# Patient Record
Sex: MALE | Race: WHITE | NOT HISPANIC OR LATINO | Employment: FULL TIME | URBAN - METROPOLITAN AREA
[De-identification: names, ages, dates, MRNs, and addresses within clinical notes are randomized per-mention and may not be internally consistent; named-entity substitution may affect disease eponyms.]

---

## 2018-10-10 ENCOUNTER — LAB VISIT (OUTPATIENT)
Dept: LAB | Facility: HOSPITAL | Age: 33
End: 2018-10-10
Attending: INTERNAL MEDICINE
Payer: COMMERCIAL

## 2018-10-10 ENCOUNTER — OFFICE VISIT (OUTPATIENT)
Dept: FAMILY MEDICINE | Facility: CLINIC | Age: 33
End: 2018-10-10
Payer: COMMERCIAL

## 2018-10-10 VITALS
DIASTOLIC BLOOD PRESSURE: 80 MMHG | HEIGHT: 72 IN | TEMPERATURE: 98 F | SYSTOLIC BLOOD PRESSURE: 102 MMHG | HEART RATE: 72 BPM | WEIGHT: 204.94 LBS | BODY MASS INDEX: 27.76 KG/M2

## 2018-10-10 DIAGNOSIS — Z00.00 ANNUAL PHYSICAL EXAM: ICD-10-CM

## 2018-10-10 DIAGNOSIS — Z92.29 HISTORY OF OPIATE THERAPY: ICD-10-CM

## 2018-10-10 DIAGNOSIS — R35.1 NOCTURIA MORE THAN TWICE PER NIGHT: ICD-10-CM

## 2018-10-10 DIAGNOSIS — L81.8 HISTORY OF BEING TATOOED: ICD-10-CM

## 2018-10-10 DIAGNOSIS — Z23 NEED FOR DIPHTHERIA-TETANUS-PERTUSSIS (TDAP) VACCINE: ICD-10-CM

## 2018-10-10 DIAGNOSIS — Z23 NEED FOR IMMUNIZATION AGAINST INFLUENZA: ICD-10-CM

## 2018-10-10 DIAGNOSIS — Z00.00 ANNUAL PHYSICAL EXAM: Primary | ICD-10-CM

## 2018-10-10 DIAGNOSIS — R35.0 FREQUENT URINATION: ICD-10-CM

## 2018-10-10 DIAGNOSIS — Z78.9 EXCESSIVE CAFFEINE INTAKE: ICD-10-CM

## 2018-10-10 DIAGNOSIS — E66.3 OVERWEIGHT (BMI 25.0-29.9): ICD-10-CM

## 2018-10-10 LAB
BILIRUB UR QL STRIP: NEGATIVE
CLARITY UR REFRACT.AUTO: CLEAR
COLOR UR AUTO: YELLOW
GLUCOSE SERPL-MCNC: 108 MG/DL (ref 70–110)
GLUCOSE UR QL STRIP: NEGATIVE
HGB UR QL STRIP: ABNORMAL
KETONES UR QL STRIP: NEGATIVE
LEUKOCYTE ESTERASE UR QL STRIP: NEGATIVE
MICROSCOPIC COMMENT: NORMAL
NITRITE UR QL STRIP: NEGATIVE
PH UR STRIP: 6 [PH] (ref 5–8)
PROT UR QL STRIP: NEGATIVE
RBC #/AREA URNS AUTO: 4 /HPF (ref 0–4)
SP GR UR STRIP: 1.01 (ref 1–1.03)
SQUAMOUS #/AREA URNS AUTO: 0 /HPF
URN SPEC COLLECT METH UR: ABNORMAL
UROBILINOGEN UR STRIP-ACNC: NEGATIVE EU/DL

## 2018-10-10 PROCEDURE — 87086 URINE CULTURE/COLONY COUNT: CPT

## 2018-10-10 PROCEDURE — 90715 TDAP VACCINE 7 YRS/> IM: CPT | Mod: S$GLB,,, | Performed by: INTERNAL MEDICINE

## 2018-10-10 PROCEDURE — 81001 URINALYSIS AUTO W/SCOPE: CPT

## 2018-10-10 PROCEDURE — 90472 IMMUNIZATION ADMIN EACH ADD: CPT | Mod: S$GLB,,, | Performed by: INTERNAL MEDICINE

## 2018-10-10 PROCEDURE — 99999 PR PBB SHADOW E&M-NEW PATIENT-LVL III: CPT | Mod: PBBFAC,,, | Performed by: INTERNAL MEDICINE

## 2018-10-10 PROCEDURE — 90686 IIV4 VACC NO PRSV 0.5 ML IM: CPT | Mod: S$GLB,,, | Performed by: INTERNAL MEDICINE

## 2018-10-10 PROCEDURE — 99385 PREV VISIT NEW AGE 18-39: CPT | Mod: 25,S$GLB,, | Performed by: INTERNAL MEDICINE

## 2018-10-10 PROCEDURE — 90471 IMMUNIZATION ADMIN: CPT | Mod: S$GLB,,, | Performed by: INTERNAL MEDICINE

## 2018-10-10 PROCEDURE — 82948 REAGENT STRIP/BLOOD GLUCOSE: CPT | Mod: S$GLB,,, | Performed by: INTERNAL MEDICINE

## 2018-10-10 RX ORDER — BUPRENORPHINE HYDROCHLORIDE, NALOXONE HYDROCHLORIDE 8; 2 MG/1; MG/1
1 FILM, SOLUBLE BUCCAL; SUBLINGUAL 2 TIMES DAILY
Refills: 2 | COMMUNITY
Start: 2018-09-25 | End: 2018-12-18

## 2018-10-10 NOTE — PROGRESS NOTES
Subjective:       Patient ID: Raúl Francis is a 33 y.o. male.    Chief Complaint: Establish Care and Urinary Frequency (started a year ago)    HPI  Here to get est w me as his PCP.  PMH and surgical hx delineated and noted. SMH/FMH also delineated and noted. ROS obtained at length prior to physical being performed.  Vaccines discussed; influ vaccine today w Tdap vaccine.  TC never needed; no FMH of cancer. No FMH of DM, HTN, or HLP, or CAD.  Routine initial colonoscopy to be at age 43  Hx of overweight though; BMI 27.79 discussed need for exercise w weight reduction.  Frequent urination for 1 yr; caffeine has heavy intake: 2 energy drinks a day; coffee on weekend; eats a lot of chocolate as well daily. Drinks Coke Zero all during the day. No dysuria or foul odor to his urine. No fever or chills. Nocturia avr 3x a night; no FMH of prostate cancer. No flank pain or abd pain. FMH neg for DM. EFREN declined.  Total time: 905-946; >50% time spent on discussion, counseling, and review.    Review of Systems   Constitutional: Negative for appetite change and unexpected weight change.   HENT: Negative for congestion, postnasal drip, rhinorrhea and sinus pressure.         Denies seasonal allergies, or perennial allergies   Eyes: Negative for discharge and itching.   Respiratory: Negative for cough, chest tightness, shortness of breath and wheezing.    Cardiovascular: Negative for chest pain, palpitations and leg swelling.   Gastrointestinal: Negative for abdominal pain, blood in stool, constipation, diarrhea, nausea and vomiting.        Reflux at times; zantac 150 as needed. Advised against bedtime snacks.   Endocrine: Negative for polydipsia, polyphagia and polyuria.   Genitourinary: Negative for dysuria and hematuria.   Musculoskeletal: Negative for arthralgias and myalgias.   Skin: Negative for rash.   Allergic/Immunologic: Negative for environmental allergies and food allergies.   Neurological: Negative for tremors,  seizures and headaches.   Hematological: Negative for adenopathy. Does not bruise/bleed easily.   Psychiatric/Behavioral:        Denies anxiety or depression.       Objective:      Vitals:    10/10/18 0855   BP: 102/80   Pulse: 72   Temp: 98.3 °F (36.8 °C)   Weight: 92.9 kg (204 lb 14.7 oz)   Height: 6' (1.829 m)     Body mass index is 27.79 kg/m².    Physical Exam   Constitutional: He is oriented to person, place, and time. He appears well-developed and well-nourished.   HENT:   Head: Normocephalic and atraumatic.   Throat pink and moist.   Eyes: EOM are normal.   Neck: Normal range of motion. Neck supple. No thyromegaly present.   No carotid bruits heard.   Cardiovascular: Normal rate, regular rhythm and normal heart sounds. Exam reveals no gallop.   No murmur heard.  Pulmonary/Chest: Effort normal and breath sounds normal. No respiratory distress. He has no wheezes. He has no rales.   Abdominal: Soft. Bowel sounds are normal. He exhibits no distension. There is no tenderness. There is no rebound and no guarding.   Musculoskeletal: Normal range of motion. He exhibits no edema.   No tenderness to palp T-L-S sp noted.   Lymphadenopathy:     He has no cervical adenopathy.   Neurological: He is alert and oriented to person, place, and time.   Moves all 4 extremities fine.   Skin: No rash noted.   tattooes UE's.   Psychiatric: He has a normal mood and affect. His behavior is normal. Thought content normal.   Vitals reviewed.      Assessment:       1. Annual physical exam    2. Need for immunization against influenza    3. Need for diphtheria-tetanus-pertussis (Tdap) vaccine    4. Overweight (BMI 25.0-29.9)    5. Frequent urination    6. Nocturia more than twice per night    7. Excessive caffeine intake    8. History of opiate therapy    9. History of being tatooed        Plan:       Annual physical exam; regular exercise; weight reduction; smaller portions; yearly physical; healthy lifestyle choices; low fat high fiber  diet; needs to markedly decrease caffeine intake. Get annual physical, and labs as per provider recommendations.  Keep his follow-up appointments with his physicians and providers as directed including workups; ; routine total colonoscopy to begin at age 43..  -     Lipid panel; Future; Expected date: 10/10/2018  -     CBC auto differential; Future; Expected date: 10/10/2018  -     Comprehensive metabolic panel; Future; Expected date: 10/10/2018  -     URINALYSIS; Future; Expected date: 10/10/2018  -     Urine culture; Future; Expected date: 10/10/2018  -     (In Office Administered) Tdap Vaccine  -     Influenza - Quadrivalent (3 years & older)  -     HEMOGLOBIN A1C; Future; Expected date: 10/10/2018    Need for immunization against influenza  -     Influenza - Quadrivalent (3 years & older)    Need for diphtheria-tetanus-pertussis (Tdap) vaccine  -     (In Office Administered) Tdap Vaccine    Overweight (BMI 25.0-29.9). Caloric restriction w regular exercise and weight reduction.  -     Lipid panel; Future; Expected date: 10/10/2018  -     HEMOGLOBIN A1C; Future; Expected date: 10/10/2018    Frequent urination; no signs of UTI clinically; suspect excess caffeine as culprit; but need to r/o other causes as well.  -     Comprehensive metabolic panel; Future; Expected date: 10/10/2018  -     URINALYSIS; Future; Expected date: 10/10/2018  -     Urine culture; Future; Expected date: 10/10/2018  -     HEMOGLOBIN A1C; Future; Expected date: 10/10/2018  -     POCT Glucose    Nocturia more then 2x a night; with excessive caffeine intake have 1st next him to reduce his caffeine intake at least 50% if not more; there is no family history of prostate cancer but will send in a PSA for screening; patient wishes to wait on digital rectal exam; less likely as the possibility of prostatitis which is also in the differential as well as UTI.  Will await urinalysis results and culture    Excessive caffeine intake; wean down as  tolerated; needs to significantly reduce his caffeine intake as discussed.  -     Comprehensive metabolic panel; Future; Expected date: 10/10/2018    History of opiate therapy; on suboxone per [facility].  -     Comprehensive metabolic panel; Future; Expected date: 10/10/2018    History of being tatooed  -     Hepatitis C antibody; Future; Expected date: 10/10/2018  -     Hepatitis B core antibody, IgM; Future; Expected date: 10/10/2018  -     Hepatitis B surface antigen; Future; Expected date: 10/10/2018

## 2018-10-10 NOTE — PATIENT INSTRUCTIONS
Annual physical exam; regular exercise; weight reduction; smaller portions; yearly physical; healthy lifestyle choices; low fat high fiber diet; needs to markedly decrease caffeine intake. Get annual physical, and labs as per provider recommendations.  -     Lipid panel; Future; Expected date: 10/10/2018  -     CBC auto differential; Future; Expected date: 10/10/2018  -     Comprehensive metabolic panel; Future; Expected date: 10/10/2018  -     URINALYSIS; Future; Expected date: 10/10/2018  -     Urine culture; Future; Expected date: 10/10/2018  -     (In Office Administered) Tdap Vaccine  -     Influenza - Quadrivalent (3 years & older)  -     HEMOGLOBIN A1C; Future; Expected date: 10/10/2018    Need for immunization against influenza  -     Influenza - Quadrivalent (3 years & older)    Need for diphtheria-tetanus-pertussis (Tdap) vaccine  -     (In Office Administered) Tdap Vaccine    Overweight (BMI 25.0-29.9). Caloric restriction w regular exercise and weight reduction.  -     Lipid panel; Future; Expected date: 10/10/2018  -     HEMOGLOBIN A1C; Future; Expected date: 10/10/2018    Frequent urination; no signs of UTI clinically; suspect excess caffeine as culprit; but need to r/o other causes as well.  -     Comprehensive metabolic panel; Future; Expected date: 10/10/2018  -     URINALYSIS; Future; Expected date: 10/10/2018  -     Urine culture; Future; Expected date: 10/10/2018  -     HEMOGLOBIN A1C; Future; Expected date: 10/10/2018  -     POCT Glucose    Excessive caffeine intake; wean down as tolerated; needs to significantly reduce his caffeine intake as discussed.  -     Comprehensive metabolic panel; Future; Expected date: 10/10/2018    History of opiate therapy; on suboxone per [facility].  -     Comprehensive metabolic panel; Future; Expected date: 10/10/2018    History of being tatooed  -     Hepatitis C antibody; Future; Expected date: 10/10/2018  -     Hepatitis B core antibody, IgM; Future;  Expected date: 10/10/2018  -     Hepatitis B surface antigen; Future; Expected date: 10/10/2018

## 2018-10-11 LAB — BACTERIA UR CULT: NO GROWTH

## 2018-10-13 ENCOUNTER — TELEPHONE (OUTPATIENT)
Dept: FAMILY MEDICINE | Facility: CLINIC | Age: 33
End: 2018-10-13

## 2018-10-13 NOTE — TELEPHONE ENCOUNTER
----- Message from Murali Ambrose sent at 10/13/2018  8:47 AM CDT -----  Contact: wife Mikki   Wife Mikki may have missed a call from your office, please call back at [phone]

## 2018-10-14 ENCOUNTER — TELEPHONE (OUTPATIENT)
Dept: FAMILY MEDICINE | Facility: CLINIC | Age: 33
End: 2018-10-14

## 2018-10-14 DIAGNOSIS — R31.0 MACROSCOPIC HEMATURIA: Primary | ICD-10-CM

## 2018-10-14 NOTE — TELEPHONE ENCOUNTER
Please ask patient to pass by [facility] and drop off a urine specimen as a clean-catch midstream so we can repeat his urinalysis as well as microscopic; as previously his dipstick had 2+ blood on it.

## 2018-10-16 ENCOUNTER — LAB VISIT (OUTPATIENT)
Dept: LAB | Facility: HOSPITAL | Age: 33
End: 2018-10-16
Attending: INTERNAL MEDICINE
Payer: COMMERCIAL

## 2018-10-16 DIAGNOSIS — R31.0 MACROSCOPIC HEMATURIA: ICD-10-CM

## 2018-10-16 LAB
AMORPH CRY UR QL COMP ASSIST: ABNORMAL
BILIRUB UR QL STRIP: NEGATIVE
CLARITY UR REFRACT.AUTO: ABNORMAL
COLOR UR AUTO: ABNORMAL
GLUCOSE UR QL STRIP: NEGATIVE
HGB UR QL STRIP: ABNORMAL
KETONES UR QL STRIP: NEGATIVE
LEUKOCYTE ESTERASE UR QL STRIP: NEGATIVE
MICROSCOPIC COMMENT: ABNORMAL
NITRITE UR QL STRIP: NEGATIVE
PH UR STRIP: 5 [PH] (ref 5–8)
PROT UR QL STRIP: NEGATIVE
RBC #/AREA URNS AUTO: 20 /HPF (ref 0–4)
SP GR UR STRIP: 1.03 (ref 1–1.03)
URN SPEC COLLECT METH UR: ABNORMAL
UROBILINOGEN UR STRIP-ACNC: NEGATIVE EU/DL

## 2018-10-16 PROCEDURE — 81001 URINALYSIS AUTO W/SCOPE: CPT

## 2018-10-20 ENCOUNTER — TELEPHONE (OUTPATIENT)
Dept: FAMILY MEDICINE | Facility: CLINIC | Age: 33
End: 2018-10-20

## 2018-10-20 DIAGNOSIS — N30.91 CYSTITIS WITH HEMATURIA: Primary | ICD-10-CM

## 2018-10-20 RX ORDER — CIPROFLOXACIN 500 MG/1
500 TABLET ORAL EVERY 12 HOURS
Qty: 20 TABLET | Refills: 0 | Status: SHIPPED | OUTPATIENT
Start: 2018-10-20 | End: 2018-12-18 | Stop reason: ALTCHOICE

## 2018-10-20 NOTE — TELEPHONE ENCOUNTER
Please add a PSA screening to his scheduled labs with a diagnosis of nocturia 3 times per night; it is been ordered at [facility]; thank you

## 2018-10-20 NOTE — TELEPHONE ENCOUNTER
Please notify patient that with 2+ blood on his dipstick on repeat urinalysis and urine being cloudy as well as symptoms of frequency and nocturia we would like him to take 10 days of Cipro at 500 mg every 12 hr for 10 days to give him prostatitis and UTI coverage.  This has been sent into his pharmacist. After completion of the antibiotics and a few days later, would like him to go back to get repeat urinalysis specimen; keep his follow-up appointment with us in the office.  Remember to decrease his caffeine intake at least 50%; if he develops any muscle skeletal spasms or tendon issues please have him stop the Cipro and call us

## 2018-10-22 ENCOUNTER — LAB VISIT (OUTPATIENT)
Dept: LAB | Facility: HOSPITAL | Age: 33
End: 2018-10-22
Attending: INTERNAL MEDICINE
Payer: COMMERCIAL

## 2018-10-22 DIAGNOSIS — R35.0 FREQUENT URINATION: ICD-10-CM

## 2018-10-22 DIAGNOSIS — Z78.9 EXCESSIVE CAFFEINE INTAKE: ICD-10-CM

## 2018-10-22 DIAGNOSIS — E66.3 OVERWEIGHT (BMI 25.0-29.9): ICD-10-CM

## 2018-10-22 DIAGNOSIS — Z00.00 ANNUAL PHYSICAL EXAM: ICD-10-CM

## 2018-10-22 DIAGNOSIS — L81.8 HISTORY OF BEING TATOOED: ICD-10-CM

## 2018-10-22 DIAGNOSIS — Z92.29 HISTORY OF OPIATE THERAPY: ICD-10-CM

## 2018-10-22 LAB
ALBUMIN SERPL BCP-MCNC: 4 G/DL
ALP SERPL-CCNC: 88 U/L
ALT SERPL W/O P-5'-P-CCNC: 24 U/L
ANION GAP SERPL CALC-SCNC: 7 MMOL/L
AST SERPL-CCNC: 36 U/L
BASOPHILS # BLD AUTO: 0.08 K/UL
BASOPHILS NFR BLD: 0.8 %
BILIRUB SERPL-MCNC: 0.3 MG/DL
BUN SERPL-MCNC: 16 MG/DL
CALCIUM SERPL-MCNC: 9.6 MG/DL
CHLORIDE SERPL-SCNC: 105 MMOL/L
CHOLEST SERPL-MCNC: 204 MG/DL
CHOLEST/HDLC SERPL: 3.7 {RATIO}
CO2 SERPL-SCNC: 28 MMOL/L
CREAT SERPL-MCNC: 1 MG/DL
DIFFERENTIAL METHOD: ABNORMAL
EOSINOPHIL # BLD AUTO: 0.3 K/UL
EOSINOPHIL NFR BLD: 3.1 %
ERYTHROCYTE [DISTWIDTH] IN BLOOD BY AUTOMATED COUNT: 12.4 %
EST. GFR  (AFRICAN AMERICAN): >60 ML/MIN/1.73 M^2
EST. GFR  (NON AFRICAN AMERICAN): >60 ML/MIN/1.73 M^2
ESTIMATED AVG GLUCOSE: 111 MG/DL
GLUCOSE SERPL-MCNC: 90 MG/DL
HBA1C MFR BLD HPLC: 5.5 %
HCT VFR BLD AUTO: 40.5 %
HDLC SERPL-MCNC: 55 MG/DL
HDLC SERPL: 27 %
HGB BLD-MCNC: 13.2 G/DL
IMM GRANULOCYTES # BLD AUTO: 0.01 K/UL
IMM GRANULOCYTES NFR BLD AUTO: 0.1 %
LDLC SERPL CALC-MCNC: 113.4 MG/DL
LYMPHOCYTES # BLD AUTO: 3.3 K/UL
LYMPHOCYTES NFR BLD: 34.3 %
MCH RBC QN AUTO: 28.9 PG
MCHC RBC AUTO-ENTMCNC: 32.6 G/DL
MCV RBC AUTO: 89 FL
MONOCYTES # BLD AUTO: 0.6 K/UL
MONOCYTES NFR BLD: 5.6 %
NEUTROPHILS # BLD AUTO: 5.5 K/UL
NEUTROPHILS NFR BLD: 56.1 %
NONHDLC SERPL-MCNC: 149 MG/DL
NRBC BLD-RTO: 0 /100 WBC
PLATELET # BLD AUTO: 287 K/UL
PMV BLD AUTO: 10.8 FL
POTASSIUM SERPL-SCNC: 3.8 MMOL/L
PROT SERPL-MCNC: 7.4 G/DL
RBC # BLD AUTO: 4.56 M/UL
SODIUM SERPL-SCNC: 140 MMOL/L
T4 FREE SERPL-MCNC: 1.36 NG/DL
TRIGL SERPL-MCNC: 178 MG/DL
TSH SERPL DL<=0.005 MIU/L-ACNC: 1.19 UIU/ML
WBC # BLD AUTO: 9.75 K/UL

## 2018-10-22 PROCEDURE — 86705 HEP B CORE ANTIBODY IGM: CPT

## 2018-10-22 PROCEDURE — 85025 COMPLETE CBC W/AUTO DIFF WBC: CPT

## 2018-10-22 PROCEDURE — 84443 ASSAY THYROID STIM HORMONE: CPT

## 2018-10-22 PROCEDURE — 86803 HEPATITIS C AB TEST: CPT

## 2018-10-22 PROCEDURE — 83036 HEMOGLOBIN GLYCOSYLATED A1C: CPT

## 2018-10-22 PROCEDURE — 36415 COLL VENOUS BLD VENIPUNCTURE: CPT | Mod: PN

## 2018-10-22 PROCEDURE — 87340 HEPATITIS B SURFACE AG IA: CPT

## 2018-10-22 PROCEDURE — 80053 COMPREHEN METABOLIC PANEL: CPT

## 2018-10-22 PROCEDURE — 84439 ASSAY OF FREE THYROXINE: CPT

## 2018-10-22 PROCEDURE — 80061 LIPID PANEL: CPT

## 2018-10-22 NOTE — TELEPHONE ENCOUNTER
----- Message from Jair Alexander sent at 10/22/2018  9:32 AM CDT -----  Contact: Pt  Type:  Patient Returning Call    Who Called:  pt  Who Left Message for Patient:  hannah  Does the patient know what this is regarding?:  unknown  Best Call Back Number:  [phone]  Additional Information:

## 2018-10-22 NOTE — TELEPHONE ENCOUNTER
Notified patient of results and abx was sent to pharmacy. Verbalized understanding. Scheduled for urine an labs as advised by Dr Reynolds.

## 2018-10-23 LAB
HBV CORE IGM SERPL QL IA: NEGATIVE
HBV SURFACE AG SERPL QL IA: NEGATIVE
HCV AB SERPL QL IA: NEGATIVE

## 2018-10-25 ENCOUNTER — LAB VISIT (OUTPATIENT)
Dept: LAB | Facility: HOSPITAL | Age: 33
End: 2018-10-25
Attending: INTERNAL MEDICINE
Payer: COMMERCIAL

## 2018-10-25 ENCOUNTER — OFFICE VISIT (OUTPATIENT)
Dept: FAMILY MEDICINE | Facility: CLINIC | Age: 33
End: 2018-10-25
Payer: COMMERCIAL

## 2018-10-25 VITALS
HEIGHT: 72 IN | BODY MASS INDEX: 28.82 KG/M2 | TEMPERATURE: 98 F | HEART RATE: 79 BPM | DIASTOLIC BLOOD PRESSURE: 82 MMHG | SYSTOLIC BLOOD PRESSURE: 98 MMHG | OXYGEN SATURATION: 96 % | WEIGHT: 212.75 LBS

## 2018-10-25 DIAGNOSIS — D64.9 NORMOCYTIC ANEMIA: ICD-10-CM

## 2018-10-25 DIAGNOSIS — E66.3 OVERWEIGHT (BMI 25.0-29.9): ICD-10-CM

## 2018-10-25 DIAGNOSIS — E78.2 MIXED HYPERLIPIDEMIA: ICD-10-CM

## 2018-10-25 DIAGNOSIS — E78.2 MIXED HYPERLIPIDEMIA: Primary | ICD-10-CM

## 2018-10-25 LAB
BASOPHILS # BLD AUTO: 0.07 K/UL
BASOPHILS NFR BLD: 0.9 %
CHOLEST SERPL-MCNC: 193 MG/DL
CHOLEST/HDLC SERPL: 2.6 {RATIO}
DIFFERENTIAL METHOD: ABNORMAL
EOSINOPHIL # BLD AUTO: 0.3 K/UL
EOSINOPHIL NFR BLD: 3.9 %
ERYTHROCYTE [DISTWIDTH] IN BLOOD BY AUTOMATED COUNT: 12.6 %
FERRITIN SERPL-MCNC: 27 NG/ML
HCT VFR BLD AUTO: 38.6 %
HDLC SERPL-MCNC: 75 MG/DL
HDLC SERPL: 38.9 %
HGB BLD-MCNC: 12.7 G/DL
IMM GRANULOCYTES # BLD AUTO: 0.01 K/UL
IMM GRANULOCYTES NFR BLD AUTO: 0.1 %
IRON SERPL-MCNC: 124 UG/DL
LDLC SERPL CALC-MCNC: 108.4 MG/DL
LYMPHOCYTES # BLD AUTO: 3 K/UL
LYMPHOCYTES NFR BLD: 38 %
MCH RBC QN AUTO: 29.6 PG
MCHC RBC AUTO-ENTMCNC: 32.9 G/DL
MCV RBC AUTO: 90 FL
MONOCYTES # BLD AUTO: 0.5 K/UL
MONOCYTES NFR BLD: 6.5 %
NEUTROPHILS # BLD AUTO: 4 K/UL
NEUTROPHILS NFR BLD: 50.6 %
NONHDLC SERPL-MCNC: 118 MG/DL
NRBC BLD-RTO: 0 /100 WBC
PLATELET # BLD AUTO: 258 K/UL
PMV BLD AUTO: 11 FL
RBC # BLD AUTO: 4.29 M/UL
RETICS/RBC NFR AUTO: 1.7 %
SATURATED IRON: 26 %
T4 FREE SERPL-MCNC: 1.14 NG/DL
TOTAL IRON BINDING CAPACITY: 474 UG/DL
TRANSFERRIN SERPL-MCNC: 320 MG/DL
TRIGL SERPL-MCNC: 48 MG/DL
TSH SERPL DL<=0.005 MIU/L-ACNC: 0.51 UIU/ML
WBC # BLD AUTO: 7.89 K/UL

## 2018-10-25 PROCEDURE — 84443 ASSAY THYROID STIM HORMONE: CPT

## 2018-10-25 PROCEDURE — 36415 COLL VENOUS BLD VENIPUNCTURE: CPT | Mod: PN

## 2018-10-25 PROCEDURE — 85045 AUTOMATED RETICULOCYTE COUNT: CPT

## 2018-10-25 PROCEDURE — 99999 PR PBB SHADOW E&M-EST. PATIENT-LVL III: CPT | Mod: PBBFAC,,, | Performed by: INTERNAL MEDICINE

## 2018-10-25 PROCEDURE — 82728 ASSAY OF FERRITIN: CPT

## 2018-10-25 PROCEDURE — 84439 ASSAY OF FREE THYROXINE: CPT

## 2018-10-25 PROCEDURE — 80061 LIPID PANEL: CPT

## 2018-10-25 PROCEDURE — 85025 COMPLETE CBC W/AUTO DIFF WBC: CPT

## 2018-10-25 PROCEDURE — 3008F BODY MASS INDEX DOCD: CPT | Mod: CPTII,S$GLB,, | Performed by: INTERNAL MEDICINE

## 2018-10-25 PROCEDURE — 83540 ASSAY OF IRON: CPT

## 2018-10-25 PROCEDURE — 99213 OFFICE O/P EST LOW 20 MIN: CPT | Mod: S$GLB,,, | Performed by: INTERNAL MEDICINE

## 2018-10-25 NOTE — PROGRESS NOTES
Subjective:       Patient ID: Raúl Francis is a 33 y.o. male.    Chief Complaint: Results (2 w lab F/U)    HPI  Overall doing fine; here to go over his labs.  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   Mild anemia w Hg 13.2 noted; no prior hx anemia; add Men's One a Day MVI; check iron levels.  Mix HLP; mild; add low fat high fiber diet.     Review of Systems   Constitutional: Negative for appetite change and unexpected weight change.   HENT: Negative for congestion, postnasal drip, rhinorrhea and sinus pressure.         Denies seasonal allergies, or perennial allergies   Eyes: Negative for discharge and itching.   Respiratory: Negative for cough, chest tightness, shortness of breath and wheezing.    Cardiovascular: Negative for chest pain, palpitations and leg swelling.   Gastrointestinal: Negative for abdominal pain, blood in stool, constipation, diarrhea, nausea and vomiting.   Endocrine: Negative for polydipsia, polyphagia and polyuria.   Genitourinary: Negative for dysuria and hematuria.   Musculoskeletal: Negative for arthralgias and myalgias.   Skin: Negative for rash.   Allergic/Immunologic: Negative for environmental allergies and food allergies.   Neurological: Negative for tremors, seizures and headaches.   Hematological: Negative for adenopathy. Does not bruise/bleed easily.   Psychiatric/Behavioral:        Denies anxiety or depression.       Objective:      Vitals:    10/25/18 1256   BP: 98/82   BP Location: Right arm   Patient Position: Sitting   BP Method: Large (Automatic)   Pulse: 79   Temp: 98.2 °F (36.8 °C)   TempSrc: Oral   SpO2: 96%   Weight: 96.5 kg (212 lb 11.9 oz)   Height: 6' (1.829 m)  Comment: Per Pt     Body mass index is 28.85 kg/m².    Physical Exam   Constitutional: He is oriented to person, place, and time. He appears well-developed and well-nourished.   HENT:   Head: Normocephalic and atraumatic.   Eyes: EOM are normal.   Neck: Normal range of  motion. Neck supple. No thyromegaly present.   Cardiovascular: Normal rate, regular rhythm and normal heart sounds. Exam reveals no gallop.   No murmur heard.  Pulmonary/Chest: Effort normal and breath sounds normal. No respiratory distress. He has no wheezes. He has no rales.   Abdominal: Soft. Bowel sounds are normal. He exhibits no distension. There is no tenderness. There is no rebound and no guarding.   Musculoskeletal: Normal range of motion. He exhibits no edema.   Lymphadenopathy:     He has no cervical adenopathy.   Neurological: He is alert and oriented to person, place, and time.   Moves all 4 extremities fine.   Skin: No rash noted.   Psychiatric: He has a normal mood and affect. His behavior is normal. Thought content normal.   Vitals reviewed.      Assessment:       1. Mixed hyperlipidemia    2. Normocytic anemia    3. Overweight (BMI 25.0-29.9)        Plan:       Mixed hyperlipidemia. Maintain low fat high fiber diet, exercise regularly.  -     Lipid panel; Future; Expected date: 10/25/2018  -     TSH; Future; Expected date: 10/25/2018  -     T4, free; Future; Expected date: 10/25/2018    Normocytic anemia; Men's One a Day Multivitamin.  -     Ferritin; Future; Expected date: 10/25/2018  -     Iron and TIBC; Future; Expected date: 10/25/2018  -     Reticulocytes; Future; Expected date: 10/25/2018  -     CBC auto differential; Future; Expected date: 10/25/2018    Overweight (BMI 25.0-29.9). Caloric restriction w regular exercise and weight reduction. Smaller portions.  -     TSH; Future; Expected date: 10/25/2018  -     T4, free; Future; Expected date: 10/25/2018

## 2018-10-25 NOTE — PATIENT INSTRUCTIONS
Mixed hyperlipidemia. Maintain low fat high fiber diet, exercise regularly.  -     Lipid panel; Future; Expected date: 10/25/2018  -     TSH; Future; Expected date: 10/25/2018  -     T4, free; Future; Expected date: 10/25/2018    Normocytic anemia; Men's One a Day Multivitamin.  -     Ferritin; Future; Expected date: 10/25/2018  -     Iron and TIBC; Future; Expected date: 10/25/2018  -     Reticulocytes; Future; Expected date: 10/25/2018  -     CBC auto differential; Future; Expected date: 10/25/2018    Overweight (BMI 25.0-29.9). Caloric restriction w regular exercise and weight reduction. Smaller portions.  -     TSH; Future; Expected date: 10/25/2018  -     T4, free; Future; Expected date: 10/25/2018

## 2018-11-11 ENCOUNTER — TELEPHONE (OUTPATIENT)
Dept: FAMILY MEDICINE | Facility: CLINIC | Age: 33
End: 2018-11-11

## 2018-11-11 DIAGNOSIS — D50.9 IRON DEFICIENCY ANEMIA, UNSPECIFIED IRON DEFICIENCY ANEMIA TYPE: Primary | ICD-10-CM

## 2018-11-11 RX ORDER — FERROUS GLUCONATE 324(38)MG
324 TABLET ORAL
COMMUNITY
Start: 2018-11-11 | End: 2018-11-11

## 2018-11-11 RX ORDER — FERROUS GLUCONATE 324(38)MG
TABLET ORAL
Qty: 60 TABLET | Refills: 1 | COMMUNITY
Start: 2018-11-11 | End: 2018-12-18 | Stop reason: ALTCHOICE

## 2018-11-11 NOTE — TELEPHONE ENCOUNTER
Please notify patient we still need to have him go by [facility] to drop off a urine specimen as a clean-catch midstream to recheck for microscopic blood in his urine.  We will notify him with the results when we receive them.

## 2018-11-12 NOTE — TELEPHONE ENCOUNTER
Spoke to patient and scheduled for 4 week f/u. Patient will drop off urine today at either [place] or [place], whichever he happens to be closer to. Patient is aware labe to be repeated week prior to appt on 12/18. Patient will  Occult Blood Stool kit today or tomorrow.

## 2018-11-12 NOTE — TELEPHONE ENCOUNTER
Please notify patient that in his lab results he has a minimum anemia with iron level showing evidence of iron deficiency; would like him to start taking ferrous gluconate or Fergon 324 mg twice a day; I have sent into the pharmacist for him; would like him to also take a multivitamin once a day as well.  Like him to come by the office to get a FOBT stool kit to check for blood in his stool; please advise patient not to use any NSAID agents like Aleve/Naprosyn or ibuprofen/Advil/Motrin as these agents can irritate his stomach and cause ulceration and blood loss as a complication;  I would like to see him with follow-up in the office in 4 weeks; with a few days before hand his getting a repeat CBC with iron studies which have been ordered for him for reassessment..

## 2018-12-18 ENCOUNTER — LAB VISIT (OUTPATIENT)
Dept: LAB | Facility: HOSPITAL | Age: 33
End: 2018-12-18
Attending: INTERNAL MEDICINE
Payer: COMMERCIAL

## 2018-12-18 ENCOUNTER — OFFICE VISIT (OUTPATIENT)
Dept: FAMILY MEDICINE | Facility: CLINIC | Age: 33
End: 2018-12-18
Payer: COMMERCIAL

## 2018-12-18 VITALS
DIASTOLIC BLOOD PRESSURE: 88 MMHG | HEIGHT: 72 IN | RESPIRATION RATE: 18 BRPM | OXYGEN SATURATION: 96 % | HEART RATE: 85 BPM | TEMPERATURE: 99 F | WEIGHT: 208.44 LBS | SYSTOLIC BLOOD PRESSURE: 126 MMHG | BODY MASS INDEX: 28.23 KG/M2

## 2018-12-18 DIAGNOSIS — F43.9 SITUATIONAL STRESS: ICD-10-CM

## 2018-12-18 DIAGNOSIS — E66.3 OVERWEIGHT (BMI 25.0-29.9): ICD-10-CM

## 2018-12-18 DIAGNOSIS — R35.1 NOCTURIA MORE THAN TWICE PER NIGHT: ICD-10-CM

## 2018-12-18 DIAGNOSIS — D50.8 IRON DEFICIENCY ANEMIA SECONDARY TO INADEQUATE DIETARY IRON INTAKE: Primary | ICD-10-CM

## 2018-12-18 DIAGNOSIS — E78.2 MIXED HYPERLIPIDEMIA: ICD-10-CM

## 2018-12-18 DIAGNOSIS — R35.0 FREQUENT URINATION: ICD-10-CM

## 2018-12-18 DIAGNOSIS — D50.9 IRON DEFICIENCY ANEMIA, UNSPECIFIED IRON DEFICIENCY ANEMIA TYPE: ICD-10-CM

## 2018-12-18 DIAGNOSIS — R12 HEARTBURN: ICD-10-CM

## 2018-12-18 LAB
BASOPHILS # BLD AUTO: 0.04 K/UL
BASOPHILS NFR BLD: 0.5 %
COMPLEXED PSA SERPL-MCNC: 0.87 NG/ML
DIFFERENTIAL METHOD: NORMAL
EOSINOPHIL # BLD AUTO: 0.1 K/UL
EOSINOPHIL NFR BLD: 1 %
ERYTHROCYTE [DISTWIDTH] IN BLOOD BY AUTOMATED COUNT: 12.4 %
FERRITIN SERPL-MCNC: 61 NG/ML
HCT VFR BLD AUTO: 43.4 %
HGB BLD-MCNC: 14.3 G/DL
IMM GRANULOCYTES # BLD AUTO: 0.02 K/UL
IMM GRANULOCYTES NFR BLD AUTO: 0.2 %
IRON SERPL-MCNC: 144 UG/DL
LYMPHOCYTES # BLD AUTO: 2.5 K/UL
LYMPHOCYTES NFR BLD: 28.6 %
MCH RBC QN AUTO: 29.8 PG
MCHC RBC AUTO-ENTMCNC: 32.9 G/DL
MCV RBC AUTO: 90 FL
MONOCYTES # BLD AUTO: 0.5 K/UL
MONOCYTES NFR BLD: 5.9 %
NEUTROPHILS # BLD AUTO: 5.7 K/UL
NEUTROPHILS NFR BLD: 63.8 %
NRBC BLD-RTO: 0 /100 WBC
PLATELET # BLD AUTO: 308 K/UL
PMV BLD AUTO: 11.1 FL
RBC # BLD AUTO: 4.8 M/UL
RETICS/RBC NFR AUTO: 1.2 %
SATURATED IRON: 30 %
TOTAL IRON BINDING CAPACITY: 482 UG/DL
TRANSFERRIN SERPL-MCNC: 326 MG/DL
WBC # BLD AUTO: 8.85 K/UL

## 2018-12-18 PROCEDURE — 36415 COLL VENOUS BLD VENIPUNCTURE: CPT | Mod: PN

## 2018-12-18 PROCEDURE — 99214 OFFICE O/P EST MOD 30 MIN: CPT | Mod: S$GLB,,, | Performed by: INTERNAL MEDICINE

## 2018-12-18 PROCEDURE — 84153 ASSAY OF PSA TOTAL: CPT

## 2018-12-18 PROCEDURE — 85025 COMPLETE CBC W/AUTO DIFF WBC: CPT

## 2018-12-18 PROCEDURE — 85045 AUTOMATED RETICULOCYTE COUNT: CPT

## 2018-12-18 PROCEDURE — 82728 ASSAY OF FERRITIN: CPT

## 2018-12-18 PROCEDURE — 83540 ASSAY OF IRON: CPT

## 2018-12-18 PROCEDURE — 99999 PR PBB SHADOW E&M-EST. PATIENT-LVL III: CPT | Mod: PBBFAC,,, | Performed by: INTERNAL MEDICINE

## 2018-12-18 PROCEDURE — 3008F BODY MASS INDEX DOCD: CPT | Mod: CPTII,S$GLB,, | Performed by: INTERNAL MEDICINE

## 2018-12-18 RX ORDER — OMEPRAZOLE 40 MG/1
40 CAPSULE, DELAYED RELEASE ORAL DAILY
Qty: 30 CAPSULE | Refills: 2 | Status: SHIPPED | OUTPATIENT
Start: 2018-12-18 | End: 2020-08-04

## 2018-12-18 RX ORDER — BUPRENORPHINE HYDROCHLORIDE AND NALOXONE HYDROCHLORIDE 5.7; 1.4 MG/1; MG/1
TABLET, ORALLY DISINTEGRATING SUBLINGUAL
Refills: 1 | COMMUNITY
Start: 2018-12-16 | End: 2020-08-04

## 2018-12-18 RX ORDER — FERROUS GLUCONATE 324(38)MG
324 TABLET ORAL
Qty: 60 TABLET | Refills: 2 | COMMUNITY
Start: 2018-12-18 | End: 2019-02-06 | Stop reason: SDUPTHER

## 2018-12-18 NOTE — PATIENT INSTRUCTIONS
bailee deficiency anemia secondary to inadequate dietary iron intake  -     ferrous gluconate (FERGON) 324 MG tablet; Take 1 tablet (324 mg total) by mouth daily with breakfast.  Dispense: 60 tablet; Refill: 2  -     omeprazole (PRILOSEC) 40 MG capsule; Take 1 capsule (40 mg total) by mouth once daily.  Dispense: 30 capsule; Refill: 2    Mixed hyperlipidemia. Maintain low fat high fiber diet, exercise regularly.Weight reduction    Overweight (BMI 25.0-29.9). Caloric restriction w regular exercise and weight reduction.    Heartburn; occasionally uses ibuprofen for back pain; no NSAID agents; stop ibuprofen; use tylenol arthritis instead. Change zantac to 150 mg 2x a day as needed.  iFOBT pending.  -     omeprazole (PRILOSEC) 40 MG capsule; Take 1 capsule (40 mg total) by mouth once daily.  Dispense: 30 capsule; Refill: 2    Situational stress; meds felt not needed. Counselor felt not needed by pt; regular exercise w stress reduction; limit caffeine.

## 2018-12-18 NOTE — PROGRESS NOTES
Subjective:       Patient ID: Raúl Francis is a 33 y.o. male.    Chief Complaint: Results (lab work and routine follow up )    HPI  Overall he's been doing well.    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     Iron def anemia; forgot to get his bloodwork done for today's visit; resume ferrous gluconate 324 mg BID; off 1 month now. Needs stool for iFOBT as well. Cont MVI.     Heartburn; daily; no belching; Uses zantac 150 mg 2x a day and helping. Fair amount of stress. Work is better; finances improving. No abd pain, melena or hematochezia. Family issues w his sister-in-law. Med felt not needed. Counselor felt not needed by pt. Also occasionally uses ibuprofen for back pain; advised to use tylenol instead.    Mix HLP: on low fat high fiber diet; exercise could be more.     Review of Systems   Constitutional: Negative for activity change and unexpected weight change.   HENT: Negative for hearing loss, rhinorrhea and trouble swallowing.    Eyes: Negative for discharge and visual disturbance.   Respiratory: Negative for chest tightness and wheezing.    Cardiovascular: Negative for chest pain and palpitations.   Gastrointestinal: Positive for diarrhea. Negative for blood in stool, constipation and vomiting.        Stomach virus good now.   Endocrine: Negative for polydipsia and polyuria.   Genitourinary: Negative for difficulty urinating, hematuria and urgency.   Musculoskeletal: Negative for arthralgias, joint swelling and neck pain.   Skin: Negative for rash.   Allergic/Immunologic: Negative for environmental allergies and food allergies.   Neurological: Negative for syncope, weakness and headaches.   Hematological: Negative for adenopathy. Does not bruise/bleed easily.   Psychiatric/Behavioral: Negative for confusion and dysphoric mood.       Objective:      Vitals:    12/18/18 1341   BP: 126/88   Pulse: 85   Resp: 18   Temp: 98.7 °F (37.1 °C)    TempSrc: Oral   SpO2: 96%   Weight: 94.6 kg (208 lb 7.1 oz)   Height: 6' (1.829 m)     Body mass index is 28.27 kg/m².    Physical Exam   Constitutional: He is oriented to person, place, and time. He appears well-developed and well-nourished.   HENT:   Head: Normocephalic and atraumatic.   Eyes: EOM are normal.   Neck: Normal range of motion. Neck supple. No thyromegaly present.   Cardiovascular: Normal rate, regular rhythm and normal heart sounds. Exam reveals no gallop.   No murmur heard.  Pulmonary/Chest: Effort normal and breath sounds normal. No respiratory distress. He has no wheezes. He has no rales.   Abdominal: Soft. Bowel sounds are normal. He exhibits no distension. There is no tenderness. There is no rebound and no guarding.   Musculoskeletal: Normal range of motion. He exhibits no edema.   Lymphadenopathy:     He has no cervical adenopathy.   Neurological: He is alert and oriented to person, place, and time.   Moves all 4 extremities fine.   Skin: No rash noted.   Psychiatric: He has a normal mood and affect. His behavior is normal. Thought content normal.   Vitals reviewed.      Assessment:       1. Iron deficiency anemia secondary to inadequate dietary iron intake    2. Mixed hyperlipidemia    3. Overweight (BMI 25.0-29.9)    4. Heartburn    5. Situational stress        Plan:       Iron deficiency anemia secondary to inadequate dietary iron intake  -     ferrous gluconate (FERGON) 324 MG tablet; Take 1 tablet (324 mg total) by mouth daily with breakfast.  Dispense: 60 tablet; Refill: 2  -     omeprazole (PRILOSEC) 40 MG capsule; Take 1 capsule (40 mg total) by mouth once daily.  Dispense: 30 capsule; Refill: 2    Mixed hyperlipidemia. Maintain low fat high fiber diet, exercise regularly.Weight reduction    Overweight (BMI 25.0-29.9). Caloric restriction w regular exercise and weight reduction.    Heartburn; occasionally uses ibuprofen for back pain; no NSAID agents; stop ibuprofen; use tylenol  arthritis instead. Change zantac to 150 mg 2x a day as needed.  iFOBT pending.  -     omeprazole (PRILOSEC) 40 MG capsule; Take 1 capsule (40 mg total) by mouth once daily.  Dispense: 30 capsule; Refill: 2    Situational stress; meds felt not needed. Counselor felt not needed by pt; regular exercise w stress reduction; limit caffeine.

## 2019-01-02 ENCOUNTER — TELEPHONE (OUTPATIENT)
Dept: FAMILY MEDICINE | Facility: CLINIC | Age: 34
End: 2019-01-02

## 2019-01-02 NOTE — TELEPHONE ENCOUNTER
Please have patient schedule appointment to see us in 2-3 weeks to go over his lab results and for reassessment

## 2019-02-05 ENCOUNTER — LAB VISIT (OUTPATIENT)
Dept: LAB | Facility: HOSPITAL | Age: 34
End: 2019-02-05
Attending: INTERNAL MEDICINE
Payer: COMMERCIAL

## 2019-02-05 DIAGNOSIS — D50.9 IRON DEFICIENCY ANEMIA, UNSPECIFIED IRON DEFICIENCY ANEMIA TYPE: ICD-10-CM

## 2019-02-05 PROCEDURE — 82274 ASSAY TEST FOR BLOOD FECAL: CPT

## 2019-02-06 ENCOUNTER — OFFICE VISIT (OUTPATIENT)
Dept: FAMILY MEDICINE | Facility: CLINIC | Age: 34
End: 2019-02-06
Payer: COMMERCIAL

## 2019-02-06 VITALS
HEIGHT: 72 IN | DIASTOLIC BLOOD PRESSURE: 78 MMHG | SYSTOLIC BLOOD PRESSURE: 112 MMHG | BODY MASS INDEX: 28.9 KG/M2 | HEART RATE: 74 BPM | WEIGHT: 213.38 LBS | RESPIRATION RATE: 17 BRPM | OXYGEN SATURATION: 98 % | TEMPERATURE: 99 F

## 2019-02-06 DIAGNOSIS — E78.2 MIXED HYPERLIPIDEMIA: ICD-10-CM

## 2019-02-06 DIAGNOSIS — E66.3 OVERWEIGHT (BMI 25.0-29.9): ICD-10-CM

## 2019-02-06 DIAGNOSIS — D50.8 IRON DEFICIENCY ANEMIA SECONDARY TO INADEQUATE DIETARY IRON INTAKE: Primary | ICD-10-CM

## 2019-02-06 DIAGNOSIS — K21.9 GASTROESOPHAGEAL REFLUX DISEASE, ESOPHAGITIS PRESENCE NOT SPECIFIED: ICD-10-CM

## 2019-02-06 PROCEDURE — 99999 PR PBB SHADOW E&M-EST. PATIENT-LVL III: CPT | Mod: PBBFAC,,, | Performed by: INTERNAL MEDICINE

## 2019-02-06 PROCEDURE — 99214 OFFICE O/P EST MOD 30 MIN: CPT | Mod: S$GLB,,, | Performed by: INTERNAL MEDICINE

## 2019-02-06 PROCEDURE — 3008F BODY MASS INDEX DOCD: CPT | Mod: CPTII,S$GLB,, | Performed by: INTERNAL MEDICINE

## 2019-02-06 PROCEDURE — 99999 PR PBB SHADOW E&M-EST. PATIENT-LVL III: ICD-10-PCS | Mod: PBBFAC,,, | Performed by: INTERNAL MEDICINE

## 2019-02-06 PROCEDURE — 3008F PR BODY MASS INDEX (BMI) DOCUMENTED: ICD-10-PCS | Mod: CPTII,S$GLB,, | Performed by: INTERNAL MEDICINE

## 2019-02-06 PROCEDURE — 99214 PR OFFICE/OUTPT VISIT, EST, LEVL IV, 30-39 MIN: ICD-10-PCS | Mod: S$GLB,,, | Performed by: INTERNAL MEDICINE

## 2019-02-06 RX ORDER — FERROUS GLUCONATE 324(38)MG
324 TABLET ORAL
Qty: 90 TABLET | Refills: 0 | COMMUNITY
Start: 2019-02-06 | End: 2022-10-28

## 2019-02-06 NOTE — PROGRESS NOTES
Subjective:       Patient ID: Raúl Francis is a 33 y.o. male.    Chief Complaint: Results (lab work and routine follow up )    HPI  Overall he's been doing well.  GERD: controlled a lot better w omeprazole. Needs to use daily. Can try decreasing to every other day, then prn trial.   Overweight: exercises w walking 2x a week w his dogs; and moved to apartments w gym.   Iron def anemia; w iron supplements anemia has resolved. No NSAID agents. Use tylenol for pain. H/H 3 mos ago was 12.7/38.6 and updated 14.3/43.4; pt also feels like he has more energy; iFOBT pending.   Mix HLP; Oct lipid panel had improved of note; low fat high fiber diet and exercises.  ZUBSOLV sl from [place] to prevent opiate addiction; Dr Posadas.  Total time: 0945-10:10 am; >50% time spent on discussion, counseling, and review. Labs ordered for f/u.    Review of Systems   Constitutional: Positive for activity change and unexpected weight change.        Working out more. today 213 lb; last visit 12/18/18 was 208 lbs. Recently moved into new apartment. Working out more as well.   HENT: Negative for hearing loss, rhinorrhea and trouble swallowing.    Eyes: Negative for discharge and visual disturbance.   Respiratory: Negative for chest tightness and wheezing.    Cardiovascular: Negative for chest pain and palpitations.   Gastrointestinal: Negative for blood in stool, constipation, diarrhea and vomiting.   Endocrine: Negative for polydipsia and polyuria.   Genitourinary: Negative for difficulty urinating, hematuria and urgency.   Musculoskeletal: Negative for arthralgias, joint swelling and neck pain.   Skin: Negative for rash.   Allergic/Immunologic: Negative for environmental allergies and food allergies.   Neurological: Negative for weakness and headaches.   Psychiatric/Behavioral: Negative for confusion and dysphoric mood.       Objective:      Vitals:    02/06/19 0918   BP: 112/78   Pulse: 74   Resp: 17   Temp: 98.6 °F (37 °C)   TempSrc:  Oral   SpO2: 98%   Weight: 96.8 kg (213 lb 6.5 oz)   Height: 6' (1.829 m)     Body mass index is 28.94 kg/m².    Physical Exam   Constitutional: He is oriented to person, place, and time. He appears well-developed and well-nourished.   HENT:   Head: Normocephalic and atraumatic.   Eyes: EOM are normal.   Neck: Normal range of motion. Neck supple. No thyromegaly present.   Cardiovascular: Normal rate, regular rhythm and normal heart sounds. Exam reveals no gallop.   No murmur heard.  Pulmonary/Chest: Effort normal and breath sounds normal. No respiratory distress. He has no wheezes. He has no rales.   Abdominal: Soft. Bowel sounds are normal. He exhibits no distension. There is no tenderness. There is no rebound and no guarding.   Musculoskeletal: Normal range of motion. He exhibits no edema.   Lymphadenopathy:     He has no cervical adenopathy.   Neurological: He is alert and oriented to person, place, and time.   Moves all 4 extremities fine.   Skin: No rash noted.   Psychiatric: He has a normal mood and affect. His behavior is normal. Thought content normal.   Vitals reviewed.      Assessment:       1. Iron deficiency anemia secondary to inadequate dietary iron intake    2. Mixed hyperlipidemia    3. Overweight (BMI 25.0-29.9)    4. Gastroesophageal reflux disease, esophagitis presence not specified        Plan:       Iron deficiency anemia secondary to inadequate dietary iron intake; Men's MVI daily to continue.   -     ferrous gluconate (FERGON) 324 MG tablet; Take 1 tablet (324 mg total) by mouth daily with breakfast.  Dispense: 90 tablet; Refill: 0  -     CBC auto differential; Future; Expected date: 02/06/2019  -     Ferritin; Future; Expected date: 02/06/2019  -     Iron and TIBC; Future; Expected date: 02/06/2019    Mixed hyperlipidemia. Maintain low fat high fiber diet, exercise regularly. Weight reduction.    Overweight (BMI 25.0-29.9). Caloric restriction w regular exercise and weight  reduction.    Gastroesophageal reflux disease, esophagitis presence not specified. No bedtime snacks; weight reduction. Change omeprazole to 40 mg every other day for couple of weeks then as needed.

## 2019-02-06 NOTE — PATIENT INSTRUCTIONS
Iron deficiency anemia secondary to inadequate dietary iron intake; Men's MVI daily to continue.   -     ferrous gluconate (FERGON) 324 MG tablet; Take 1 tablet (324 mg total) by mouth daily with breakfast.  Dispense: 90 tablet; Refill: 0  -     CBC auto differential; Future; Expected date: 02/06/2019  -     Ferritin; Future; Expected date: 02/06/2019  -     Iron and TIBC; Future; Expected date: 02/06/2019    Mixed hyperlipidemia. Maintain low fat high fiber diet, exercise regularly. Weight reduction.    Overweight (BMI 25.0-29.9). Caloric restriction w regular exercise and weight reduction.    Gastroesophageal reflux disease, esophagitis presence not specified. No bedtime snacks; weight reduction. Change omeprazole to 40 mg every other day for couple of weeks then as needed.

## 2019-02-08 LAB — HEMOCCULT STL QL IA: NEGATIVE

## 2020-02-14 ENCOUNTER — TELEPHONE (OUTPATIENT)
Dept: FAMILY MEDICINE | Facility: CLINIC | Age: 35
End: 2020-02-14

## 2020-02-14 ENCOUNTER — OFFICE VISIT (OUTPATIENT)
Dept: FAMILY MEDICINE | Facility: CLINIC | Age: 35
End: 2020-02-14
Payer: COMMERCIAL

## 2020-02-14 VITALS
BODY MASS INDEX: 30.71 KG/M2 | WEIGHT: 226.75 LBS | HEART RATE: 71 BPM | TEMPERATURE: 98 F | HEIGHT: 72 IN | OXYGEN SATURATION: 98 % | SYSTOLIC BLOOD PRESSURE: 122 MMHG | DIASTOLIC BLOOD PRESSURE: 78 MMHG

## 2020-02-14 DIAGNOSIS — H66.93 ACUTE BACTERIAL INFECTION OF BOTH MIDDLE EARS: Primary | ICD-10-CM

## 2020-02-14 DIAGNOSIS — T88.7XXA SIDE EFFECT OF MEDICATION: ICD-10-CM

## 2020-02-14 DIAGNOSIS — R12 HEARTBURN: ICD-10-CM

## 2020-02-14 DIAGNOSIS — H60.399 ACUTE BACTERIAL OTITIS EXTERNA: ICD-10-CM

## 2020-02-14 DIAGNOSIS — R51.9 SINUS HEADACHE: ICD-10-CM

## 2020-02-14 DIAGNOSIS — E66.9 OBESITY (BMI 30.0-34.9): ICD-10-CM

## 2020-02-14 DIAGNOSIS — B96.89 ACUTE BACTERIAL SINUSITIS: ICD-10-CM

## 2020-02-14 DIAGNOSIS — F41.8 SITUATIONAL ANXIETY: ICD-10-CM

## 2020-02-14 DIAGNOSIS — J01.90 ACUTE BACTERIAL SINUSITIS: ICD-10-CM

## 2020-02-14 DIAGNOSIS — B96.89 ACUTE BACTERIAL PHARYNGITIS: ICD-10-CM

## 2020-02-14 DIAGNOSIS — R42 DIZZINESS: ICD-10-CM

## 2020-02-14 DIAGNOSIS — J02.8 ACUTE BACTERIAL PHARYNGITIS: ICD-10-CM

## 2020-02-14 DIAGNOSIS — K29.00 ACUTE GASTRITIS, PRESENCE OF BLEEDING UNSPECIFIED, UNSPECIFIED GASTRITIS TYPE: ICD-10-CM

## 2020-02-14 DIAGNOSIS — J30.89 ENVIRONMENTAL AND SEASONAL ALLERGIES: ICD-10-CM

## 2020-02-14 DIAGNOSIS — R11.0 NAUSEA: ICD-10-CM

## 2020-02-14 DIAGNOSIS — H83.09 LABYRINTHITIS, UNSPECIFIED LATERALITY: ICD-10-CM

## 2020-02-14 PROCEDURE — 96372 THER/PROPH/DIAG INJ SC/IM: CPT | Mod: S$GLB,,, | Performed by: INTERNAL MEDICINE

## 2020-02-14 PROCEDURE — 99999 PR PBB SHADOW E&M-EST. PATIENT-LVL III: CPT | Mod: PBBFAC,,, | Performed by: INTERNAL MEDICINE

## 2020-02-14 PROCEDURE — 3008F BODY MASS INDEX DOCD: CPT | Mod: CPTII,S$GLB,, | Performed by: INTERNAL MEDICINE

## 2020-02-14 PROCEDURE — 99999 PR PBB SHADOW E&M-EST. PATIENT-LVL III: ICD-10-PCS | Mod: PBBFAC,,, | Performed by: INTERNAL MEDICINE

## 2020-02-14 PROCEDURE — 3008F PR BODY MASS INDEX (BMI) DOCUMENTED: ICD-10-PCS | Mod: CPTII,S$GLB,, | Performed by: INTERNAL MEDICINE

## 2020-02-14 PROCEDURE — 99214 OFFICE O/P EST MOD 30 MIN: CPT | Mod: 25,S$GLB,, | Performed by: INTERNAL MEDICINE

## 2020-02-14 PROCEDURE — 99214 PR OFFICE/OUTPT VISIT, EST, LEVL IV, 30-39 MIN: ICD-10-PCS | Mod: 25,S$GLB,, | Performed by: INTERNAL MEDICINE

## 2020-02-14 PROCEDURE — 96372 PR INJECTION,THERAP/PROPH/DIAG2ST, IM OR SUBCUT: ICD-10-PCS | Mod: S$GLB,,, | Performed by: INTERNAL MEDICINE

## 2020-02-14 RX ORDER — OMEPRAZOLE 40 MG/1
40 CAPSULE, DELAYED RELEASE ORAL DAILY
Qty: 30 CAPSULE | Refills: 2 | Status: SHIPPED | OUTPATIENT
Start: 2020-02-14 | End: 2020-05-29

## 2020-02-14 RX ORDER — CEFDINIR 300 MG/1
300 CAPSULE ORAL 2 TIMES DAILY
Qty: 20 CAPSULE | Refills: 0 | Status: SHIPPED | OUTPATIENT
Start: 2020-02-14 | End: 2020-02-24

## 2020-02-14 RX ORDER — NEOMYCIN SULFATE, POLYMYXIN B SULFATE AND HYDROCORTISONE 10; 3.5; 1 MG/ML; MG/ML; [USP'U]/ML
3 SUSPENSION/ DROPS AURICULAR (OTIC) 4 TIMES DAILY
Qty: 6 ML | Refills: 0 | Status: SHIPPED | OUTPATIENT
Start: 2020-02-14 | End: 2020-02-21

## 2020-02-14 RX ORDER — LORATADINE 10 MG/1
10 TABLET ORAL DAILY PRN
Qty: 30 TABLET | Refills: 1 | Status: SHIPPED | OUTPATIENT
Start: 2020-02-14 | End: 2020-04-23

## 2020-02-14 RX ORDER — BETAMETHASONE SODIUM PHOSPHATE AND BETAMETHASONE ACETATE 3; 3 MG/ML; MG/ML
6 INJECTION, SUSPENSION INTRA-ARTICULAR; INTRALESIONAL; INTRAMUSCULAR; SOFT TISSUE
Status: COMPLETED | OUTPATIENT
Start: 2020-02-14 | End: 2020-02-14

## 2020-02-14 RX ORDER — FLUTICASONE PROPIONATE 50 MCG
SPRAY, SUSPENSION (ML) NASAL
Qty: 16 G | Refills: 1 | Status: SHIPPED | OUTPATIENT
Start: 2020-02-14 | End: 2022-10-28

## 2020-02-14 RX ADMIN — BETAMETHASONE SODIUM PHOSPHATE AND BETAMETHASONE ACETATE 6 MG: 3; 3 INJECTION, SUSPENSION INTRA-ARTICULAR; INTRALESIONAL; INTRAMUSCULAR; SOFT TISSUE at 02:02

## 2020-02-14 NOTE — PATIENT INSTRUCTIONS
Acute bacterial infection of both middle ears; stop using Q-tips in his ear canals.   -     cefdinir (OMNICEF) 300 MG capsule; Take 1 capsule (300 mg total) by mouth 2 (two) times daily. for 10 days  Dispense: 20 capsule; Refill: 0    Dizziness; mucinex plain for congestion.     Acute bacterial otitis externa; stop using Q-tips in his ear canals.   -     neomycin-polymyxin-hydrocortisone (CORTISPORIN) 3.5-10,000-1 mg/mL-unit/mL-% otic suspension; Place 3 drops into both ears 4 (four) times daily. for 7 days  Dispense: 6 mL; Refill: 0    Acute bacterial sinusitis; warm salt water gargle as needed. Claritin 10 mg a day for congestion. Flonase 1-2 sprays a day fro congestion.   -     cefdinir (OMNICEF) 300 MG capsule; Take 1 capsule (300 mg total) by mouth 2 (two) times daily. for 10 days  Dispense: 20 capsule; Refill: 0    Sinus headaches: use mucinex plain for congestion along w claritin/flonase nasal.     Acute bacterial pharyngitis; chloraseptic for sore throat. Warm salt water gargle as needed.   -     cefdinir (OMNICEF) 300 MG capsule; Take 1 capsule (300 mg total) by mouth 2 (two) times daily. for 10 days  Dispense: 20 capsule; Refill: 0    Acute gastritis, presence of bleeding unspecified, unspecified gastritis type  -     omeprazole (PRILOSEC) 40 MG capsule; Take 1 capsule (40 mg total) by mouth once daily.  Dispense: 30 capsule; Refill: 2    Side effect of medication; quit ibuprofen; no NSAID agents;   -     omeprazole (PRILOSEC) 40 MG capsule; Take 1 capsule (40 mg total) by mouth once daily.  Dispense: 30 capsule; Refill: 2    Nausea; can use tums as needed otc. Omeparazole 40 mg a day  -     omeprazole (PRILOSEC) 40 MG capsule; Take 1 capsule (40 mg total) by mouth once daily.  Dispense: 30 capsule; Refill: 2    Heartburn; stop ibuprofen; can use tylenol for pain  -     omeprazole (PRILOSEC) 40 MG capsule; Take 1 capsule (40 mg total) by mouth once daily.  Dispense: 30 capsule; Refill: 2

## 2020-02-14 NOTE — TELEPHONE ENCOUNTER
----- Message from Ines Landeros sent at 2/14/2020  2:59 PM CST -----  Contact: self  Pt need to have a 8 week flu with Dr Reynolds.
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
## 2020-02-20 ENCOUNTER — LAB VISIT (OUTPATIENT)
Dept: LAB | Facility: HOSPITAL | Age: 35
End: 2020-02-20
Attending: INTERNAL MEDICINE
Payer: COMMERCIAL

## 2020-02-20 ENCOUNTER — OFFICE VISIT (OUTPATIENT)
Dept: FAMILY MEDICINE | Facility: CLINIC | Age: 35
End: 2020-02-20
Payer: COMMERCIAL

## 2020-02-20 VITALS
OXYGEN SATURATION: 97 % | HEART RATE: 76 BPM | TEMPERATURE: 99 F | WEIGHT: 204.06 LBS | BODY MASS INDEX: 27.64 KG/M2 | SYSTOLIC BLOOD PRESSURE: 134 MMHG | HEIGHT: 72 IN | DIASTOLIC BLOOD PRESSURE: 78 MMHG

## 2020-02-20 DIAGNOSIS — F45.8 DIZZINESS, PSYCHOGENIC: ICD-10-CM

## 2020-02-20 DIAGNOSIS — R00.2 PALPITATIONS: ICD-10-CM

## 2020-02-20 DIAGNOSIS — F32.9 REACTIVE DEPRESSION: ICD-10-CM

## 2020-02-20 DIAGNOSIS — F41.1 GAD (GENERALIZED ANXIETY DISORDER): ICD-10-CM

## 2020-02-20 DIAGNOSIS — F41.1 GAD (GENERALIZED ANXIETY DISORDER): Primary | ICD-10-CM

## 2020-02-20 DIAGNOSIS — F41.0 PANIC ATTACKS: ICD-10-CM

## 2020-02-20 DIAGNOSIS — T88.7XXA SIDE EFFECT OF DRUG: ICD-10-CM

## 2020-02-20 PROCEDURE — 99214 PR OFFICE/OUTPT VISIT, EST, LEVL IV, 30-39 MIN: ICD-10-PCS | Mod: S$GLB,,, | Performed by: INTERNAL MEDICINE

## 2020-02-20 PROCEDURE — 99214 OFFICE O/P EST MOD 30 MIN: CPT | Mod: S$GLB,,, | Performed by: INTERNAL MEDICINE

## 2020-02-20 PROCEDURE — 93010 ELECTROCARDIOGRAM REPORT: CPT | Mod: S$GLB,,, | Performed by: INTERNAL MEDICINE

## 2020-02-20 PROCEDURE — 99999 PR PBB SHADOW E&M-EST. PATIENT-LVL III: ICD-10-PCS | Mod: PBBFAC,,, | Performed by: INTERNAL MEDICINE

## 2020-02-20 PROCEDURE — 84439 ASSAY OF FREE THYROXINE: CPT

## 2020-02-20 PROCEDURE — 99999 PR PBB SHADOW E&M-EST. PATIENT-LVL III: CPT | Mod: PBBFAC,,, | Performed by: INTERNAL MEDICINE

## 2020-02-20 PROCEDURE — 84403 ASSAY OF TOTAL TESTOSTERONE: CPT

## 2020-02-20 PROCEDURE — 84402 ASSAY OF FREE TESTOSTERONE: CPT

## 2020-02-20 PROCEDURE — 93010 EKG 12-LEAD: ICD-10-PCS | Mod: S$GLB,,, | Performed by: INTERNAL MEDICINE

## 2020-02-20 PROCEDURE — 3008F BODY MASS INDEX DOCD: CPT | Mod: CPTII,S$GLB,, | Performed by: INTERNAL MEDICINE

## 2020-02-20 PROCEDURE — 3008F PR BODY MASS INDEX (BMI) DOCUMENTED: ICD-10-PCS | Mod: CPTII,S$GLB,, | Performed by: INTERNAL MEDICINE

## 2020-02-20 PROCEDURE — 93005 EKG 12-LEAD: ICD-10-PCS | Mod: S$GLB,,, | Performed by: INTERNAL MEDICINE

## 2020-02-20 PROCEDURE — 84443 ASSAY THYROID STIM HORMONE: CPT

## 2020-02-20 PROCEDURE — 93005 ELECTROCARDIOGRAM TRACING: CPT | Mod: S$GLB,,, | Performed by: INTERNAL MEDICINE

## 2020-02-20 RX ORDER — ESCITALOPRAM OXALATE 10 MG/1
TABLET ORAL
Qty: 30 TABLET | Refills: 2 | Status: SHIPPED | OUTPATIENT
Start: 2020-02-20 | End: 2020-05-22 | Stop reason: SDUPTHER

## 2020-02-20 RX ORDER — BUSPIRONE HYDROCHLORIDE 7.5 MG/1
TABLET ORAL
Qty: 90 TABLET | Refills: 11 | Status: SHIPPED | OUTPATIENT
Start: 2020-02-20 | End: 2020-02-22

## 2020-02-20 RX ORDER — PROPRANOLOL HYDROCHLORIDE 10 MG/1
10 TABLET ORAL 2 TIMES DAILY
Qty: 60 TABLET | Refills: 2 | Status: SHIPPED | OUTPATIENT
Start: 2020-02-20 | End: 2020-06-04

## 2020-02-20 NOTE — PROGRESS NOTES
Subjective:       Patient ID: Raúl Francis is a 34 y.o. male.    Chief Complaint: Dizziness (past 3/4 months off & on, often awaken with same sx's ); elevated HR; Emesis; Hot, flushed, sweaty; and Anxiety    HPI  Patient here for evaluation of generalized anxiety with suspected panic attacks.  Generalized anxiety disorder/panic attacks:  Patient suspects he has been having panic attacks for the last 3 months now averaging weekly his heart has been racing at times with associated headache dizziness and shortness of breath developing.  Headaches are in the early morning at times.  He has financial and work stressors his marriages fine.  Patient often awakens with the same symptoms.  Caffeine intake:  Energy drink 1 a day and Coke Zero which is caffeine free.  Patient understands the need to be on decaf as well as to quit the energy drinks as this can be contributing towards his anxiety  Depression:  Appetite has been good but he does admit to some depression w problems sleeping at night;staying asleep is an issue.  No crying spells but somewhat unhappy.  Content at time s not to do things at times.  He denies any suicide ideation.  Discussed with patient at length starting on low-dose Lexapro and that it will take several weeks to reach plateau and call us for any problems.  Would also start him on Inderal low-dose 10 mg b.i.d. to try and reduce his palpitations.  He also needs to quit the energy drinks and limit caffeine intake.  Discussed also putting a consult in to Psychiatry for counseling and he was receptive to this idea.    Elevated BP without the diagnosis of hypertension:  Blood pressure here manually is 134/78 likely to his anxiety and anxiousness.    EKG in the office today:  Normal sinus rhythm 64 beats per minute possible left atrial enlargement; or S4 prime in V1 through 2.  No acute ischemic changes noted.    Review of Systems   Constitutional: Negative for fever and unexpected weight change.    HENT: Negative for congestion, postnasal drip and rhinorrhea.    Respiratory: Negative for cough, chest tightness, shortness of breath and wheezing.    Cardiovascular: Negative for chest pain, palpitations and leg swelling.   Gastrointestinal: Negative for abdominal pain, blood in stool, constipation, diarrhea, nausea and vomiting.   Genitourinary: Negative for dysuria and hematuria.   Musculoskeletal: Negative for arthralgias and myalgias.   Skin: Negative for rash.   Allergic/Immunologic: Negative for food allergies.   Neurological: Positive for dizziness, light-headedness and headaches. Negative for syncope, weakness and numbness.        Occ HA's. Stress related likely. Or sinuses.    Psychiatric/Behavioral: Negative for dysphoric mood. The patient is not nervous/anxious.        Objective:      Vitals:    02/20/20 1423   BP: 134/78   BP Location: Left arm   Patient Position: Sitting   BP Method: Large (Manual)   Pulse: 76   Temp: 98.8 °F (37.1 °C)   TempSrc: Oral   SpO2: 97%   Weight: 92.5 kg (204 lb 0.6 oz)   Height: 6' (1.829 m)     Body mass index is 27.67 kg/m².  Wt Readings from Last 3 Encounters:   02/20/20 92.5 kg (204 lb 0.6 oz)   02/14/20 102.9 kg (226 lb 11.9 oz)   02/06/19 96.8 kg (213 lb 6.5 oz)        Physical Exam   Constitutional: He is oriented to person, place, and time. He appears well-developed and well-nourished.   HENT:   Head: Normocephalic and atraumatic.   Eyes: EOM are normal.   Neck: Normal range of motion. Neck supple. No thyromegaly present.   No carotid bruits heard   Cardiovascular: Normal rate, regular rhythm and normal heart sounds. Exam reveals no gallop.   No murmur heard.  Pulmonary/Chest: Effort normal and breath sounds normal. No respiratory distress. He has no wheezes. He has no rales.   Abdominal: Soft. Bowel sounds are normal. He exhibits no distension. There is no tenderness. There is no rebound and no guarding.   Musculoskeletal: Normal range of motion. He exhibits no  edema.   Lymphadenopathy:     He has no cervical adenopathy.   Neurological: He is alert and oriented to person, place, and time.   Moves all 4 extremities fine.   Skin: No rash noted.   Psychiatric: He has a normal mood and affect. His behavior is normal. Thought content normal.   Vitals reviewed.      Assessment:       1. GOLDIE (generalized anxiety disorder)    2. Side effect of drug    3. Panic attacks    4. Dizziness, psychogenic    5. Palpitations    6. Reactive depression        Plan:       GOLDIE (generalized anxiety disorder).Limit caffeine intake with stress reduction and regular exercise as tolerated. Pepcid 20 mg 2x a day as needed for GI upset.   -     escitalopram oxalate (LEXAPRO) 10 MG tablet; 5 mg po daily for 1 week, then 10 mg a day after.  Dispense: 30 tablet; Refill: 2  -     busPIRone (BUSPAR) 7.5 MG tablet; 7.5 mg po TID as needed for anxiety or panic attack.  Dispense: 90 tablet; Refill: 11  -     IN OFFICE EKG 12-LEAD (to [place])  -     Ambulatory referral/consult to Psychiatry; Future; Expected date: 02/27/2020  -     TSH; Future; Expected date: 02/20/2020  -     T4, free; Future; Expected date: 02/20/2020  -     Testosterone, free; Future; Expected date: 02/20/2020  -     Testosterone; Future; Expected date: 02/20/2020    Side effect of drug:  Knows he needs to quit the energy drinks and use only decaf    Panic attacks; limit caffeine; exercise regularly.  Regular exercise and stress reduction; no caffeinated drinks.  -     escitalopram oxalate (LEXAPRO) 10 MG tablet; 5 mg po daily for 1 week, then 10 mg a day after.  Dispense: 30 tablet; Refill: 2  -     propranolol (INDERAL) 10 MG tablet; Take 1 tablet (10 mg total) by mouth 2 (two) times daily.  Dispense: 60 tablet; Refill: 2  -     busPIRone (BUSPAR) 7.5 MG tablet; 7.5 mg po TID as needed for anxiety or panic attack.  Dispense: 90 tablet; Refill: 11  -     IN OFFICE EKG 12-LEAD (to [place])  -     Ambulatory referral/consult to Psychiatry;  Future; Expected date: 02/27/2020  -     TSH; Future; Expected date: 02/20/2020  -     T4, free; Future; Expected date: 02/20/2020  -     Testosterone, free; Future; Expected date: 02/20/2020  -     Testosterone; Future; Expected date: 02/20/2020    Dizziness, psychogenic; keep well hydrated. Quit Energy drinks he's taking.  Use only decaf.  Keep well hydrated and exercise regularly for stress reduction  -     escitalopram oxalate (LEXAPRO) 10 MG tablet; 5 mg po daily for 1 week, then 10 mg a day after.  Dispense: 30 tablet; Refill: 2  -     IN OFFICE EKG 12-LEAD (to [place])  -     Ambulatory referral/consult to Psychiatry; Future; Expected date: 02/27/2020  -     TSH; Future; Expected date: 02/20/2020  -     T4, free; Future; Expected date: 02/20/2020  -     Testosterone, free; Future; Expected date: 02/20/2020  -     Testosterone; Future; Expected date: 02/20/2020    Palpitations; Limit caffeine intake with stress reduction and regular exercise as tolerated.  Quit energy drinks.  -     busPIRone (BUSPAR) 7.5 MG tablet; 7.5 mg po TID as needed for anxiety or panic attack.  Dispense: 90 tablet; Refill: 11  -     IN OFFICE EKG 12-LEAD (to [place])  -     Ambulatory referral/consult to Psychiatry; Future; Expected date: 02/27/2020  -     TSH; Future; Expected date: 02/20/2020  -     T4, free; Future; Expected date: 02/20/2020  -     Testosterone, free; Future; Expected date: 02/20/2020  -     Testosterone; Future; Expected date: 02/20/2020    Reactive depression  -     escitalopram oxalate (LEXAPRO) 10 MG tablet; 5 mg po daily for 1 week, then 10 mg a day after.  Dispense: 30 tablet; Refill: 2  -     Ambulatory referral/consult to Psychiatry; Future; Expected date: 02/27/2020  -     TSH; Future; Expected date: 02/20/2020  -     T4, free; Future; Expected date: 02/20/2020  -     Testosterone, free; Future; Expected date: 02/20/2020  -     Testosterone; Future; Expected date: 02/20/2020

## 2020-02-20 NOTE — PATIENT INSTRUCTIONS
GOLDIE (generalized anxiety disorder).Limit caffeine intake with stress reduction and regular exercise as tolerated. Pepcid 20 mg 2x a day as needed for GI upset.   -     escitalopram oxalate (LEXAPRO) 10 MG tablet; 5 mg po daily for 1 week, then 10 mg a day after.  Dispense: 30 tablet; Refill: 2  -     busPIRone (BUSPAR) 7.5 MG tablet; 7.5 mg po TID as needed for anxiety or panic attack.  Dispense: 90 tablet; Refill: 11  -     IN OFFICE EKG 12-LEAD (to [place])  -     Ambulatory referral/consult to Psychiatry; Future; Expected date: 02/27/2020  -     TSH; Future; Expected date: 02/20/2020  -     T4, free; Future; Expected date: 02/20/2020  -     Testosterone, free; Future; Expected date: 02/20/2020  -     Testosterone; Future; Expected date: 02/20/2020    Panic attacks; limit caffeine; exercise regularly.   -     escitalopram oxalate (LEXAPRO) 10 MG tablet; 5 mg po daily for 1 week, then 10 mg a day after.  Dispense: 30 tablet; Refill: 2  -     propranolol (INDERAL) 10 MG tablet; Take 1 tablet (10 mg total) by mouth 2 (two) times daily.  Dispense: 60 tablet; Refill: 2  -     busPIRone (BUSPAR) 7.5 MG tablet; 7.5 mg po TID as needed for anxiety or panic attack.  Dispense: 90 tablet; Refill: 11  -     IN OFFICE EKG 12-LEAD (to [place])  -     Ambulatory referral/consult to Psychiatry; Future; Expected date: 02/27/2020  -     TSH; Future; Expected date: 02/20/2020  -     T4, free; Future; Expected date: 02/20/2020  -     Testosterone, free; Future; Expected date: 02/20/2020  -     Testosterone; Future; Expected date: 02/20/2020    Dizziness, psychogenic; keep well hydrated. Quit Energy drinks he's taking.   -     escitalopram oxalate (LEXAPRO) 10 MG tablet; 5 mg po daily for 1 week, then 10 mg a day after.  Dispense: 30 tablet; Refill: 2  -     IN OFFICE EKG 12-LEAD (to [place])  -     Ambulatory referral/consult to Psychiatry; Future; Expected date: 02/27/2020  -     TSH; Future; Expected date: 02/20/2020  -     T4, free;  Future; Expected date: 02/20/2020  -     Testosterone, free; Future; Expected date: 02/20/2020  -     Testosterone; Future; Expected date: 02/20/2020    Palpitations; Limit caffeine intake with stress reduction and regular exercise as tolerated.  -     busPIRone (BUSPAR) 7.5 MG tablet; 7.5 mg po TID as needed for anxiety or panic attack.  Dispense: 90 tablet; Refill: 11  -     IN OFFICE EKG 12-LEAD (to [place])  -     Ambulatory referral/consult to Psychiatry; Future; Expected date: 02/27/2020  -     TSH; Future; Expected date: 02/20/2020  -     T4, free; Future; Expected date: 02/20/2020  -     Testosterone, free; Future; Expected date: 02/20/2020  -     Testosterone; Future; Expected date: 02/20/2020    Reactive depression  -     escitalopram oxalate (LEXAPRO) 10 MG tablet; 5 mg po daily for 1 week, then 10 mg a day after.  Dispense: 30 tablet; Refill: 2  -     Ambulatory referral/consult to Psychiatry; Future; Expected date: 02/27/2020  -     TSH; Future; Expected date: 02/20/2020  -     T4, free; Future; Expected date: 02/20/2020  -     Testosterone, free; Future; Expected date: 02/20/2020  -     Testosterone; Future; Expected date: 02/20/2020

## 2020-02-21 LAB
T4 FREE SERPL-MCNC: 1.16 NG/DL (ref 0.71–1.51)
TESTOST SERPL-MCNC: 57 NG/DL (ref 304–1227)
TSH SERPL DL<=0.005 MIU/L-ACNC: 1.03 UIU/ML (ref 0.4–4)

## 2020-02-22 ENCOUNTER — TELEPHONE (OUTPATIENT)
Dept: FAMILY MEDICINE | Facility: CLINIC | Age: 35
End: 2020-02-22

## 2020-02-22 DIAGNOSIS — F41.8 SITUATIONAL ANXIETY: Primary | ICD-10-CM

## 2020-02-22 PROBLEM — R11.0 NAUSEA: Status: ACTIVE | Noted: 2020-02-22

## 2020-02-22 PROBLEM — R12 HEARTBURN: Status: ACTIVE | Noted: 2020-02-22

## 2020-02-22 PROBLEM — J30.89 ENVIRONMENTAL AND SEASONAL ALLERGIES: Status: ACTIVE | Noted: 2020-02-22

## 2020-02-22 PROBLEM — K29.00 ACUTE GASTRITIS: Status: ACTIVE | Noted: 2020-02-22

## 2020-02-22 PROBLEM — E66.9 OBESITY (BMI 30.0-34.9): Status: ACTIVE | Noted: 2020-02-22

## 2020-02-22 RX ORDER — BUSPIRONE HYDROCHLORIDE 7.5 MG/1
TABLET ORAL
Qty: 90 TABLET | Refills: 0 | Status: SHIPPED | OUTPATIENT
Start: 2020-02-22 | End: 2022-10-28

## 2020-02-24 LAB — TESTOST FREE SERPL-MCNC: 1.4 PG/ML (ref 5.1–41.5)

## 2020-04-22 ENCOUNTER — OFFICE VISIT (OUTPATIENT)
Dept: FAMILY MEDICINE | Facility: CLINIC | Age: 35
End: 2020-04-22
Payer: COMMERCIAL

## 2020-04-22 DIAGNOSIS — K29.00 ACUTE GASTRITIS, PRESENCE OF BLEEDING UNSPECIFIED, UNSPECIFIED GASTRITIS TYPE: ICD-10-CM

## 2020-04-22 DIAGNOSIS — R51.9 SINUS HEADACHE: ICD-10-CM

## 2020-04-22 DIAGNOSIS — K21.9 GASTROESOPHAGEAL REFLUX DISEASE, ESOPHAGITIS PRESENCE NOT SPECIFIED: ICD-10-CM

## 2020-04-22 DIAGNOSIS — F41.1 GENERALIZED ANXIETY DISORDER: Primary | ICD-10-CM

## 2020-04-22 DIAGNOSIS — E66.3 OVERWEIGHT (BMI 25.0-29.9): ICD-10-CM

## 2020-04-22 DIAGNOSIS — B96.89 ACUTE BACTERIAL SINUSITIS: ICD-10-CM

## 2020-04-22 DIAGNOSIS — J01.90 ACUTE BACTERIAL SINUSITIS: ICD-10-CM

## 2020-04-22 DIAGNOSIS — E61.1 IRON DEFICIENCY: ICD-10-CM

## 2020-04-22 DIAGNOSIS — R42 DIZZINESS: ICD-10-CM

## 2020-04-22 DIAGNOSIS — E34.9 HYPOTESTOSTERONISM: ICD-10-CM

## 2020-04-22 DIAGNOSIS — F41.0 PANIC ATTACKS: ICD-10-CM

## 2020-04-22 DIAGNOSIS — R00.2 PALPITATIONS: ICD-10-CM

## 2020-04-22 DIAGNOSIS — J30.89 ENVIRONMENTAL AND SEASONAL ALLERGIES: ICD-10-CM

## 2020-04-22 DIAGNOSIS — N52.9 ERECTILE DYSFUNCTION, UNSPECIFIED ERECTILE DYSFUNCTION TYPE: ICD-10-CM

## 2020-04-22 DIAGNOSIS — R53.83 FATIGUE, UNSPECIFIED TYPE: ICD-10-CM

## 2020-04-22 DIAGNOSIS — E78.2 MIXED HYPERLIPIDEMIA: ICD-10-CM

## 2020-04-22 PROBLEM — E66.9 OBESITY (BMI 30.0-34.9): Status: RESOLVED | Noted: 2020-02-22 | Resolved: 2020-04-22

## 2020-04-22 PROBLEM — R11.0 NAUSEA: Status: RESOLVED | Noted: 2020-02-22 | Resolved: 2020-04-22

## 2020-04-22 PROBLEM — R12 HEARTBURN: Status: RESOLVED | Noted: 2020-02-22 | Resolved: 2020-04-22

## 2020-04-22 PROCEDURE — 99214 OFFICE O/P EST MOD 30 MIN: CPT | Mod: 95,,, | Performed by: INTERNAL MEDICINE

## 2020-04-22 PROCEDURE — 99214 PR OFFICE/OUTPT VISIT, EST, LEVL IV, 30-39 MIN: ICD-10-PCS | Mod: 95,,, | Performed by: INTERNAL MEDICINE

## 2020-04-22 RX ORDER — SILDENAFIL 50 MG/1
50 TABLET, FILM COATED ORAL DAILY PRN
Qty: 15 TABLET | Refills: 1 | Status: SHIPPED | OUTPATIENT
Start: 2020-04-22 | End: 2022-10-28

## 2020-04-22 NOTE — PROGRESS NOTES
Subjective:      The patient location is:  work  The chief complaint leading to consultation is:  Reassessment and go over labs  Visit type: audiovisual  Total time spent with patient:  1:40 pm through 2:00 pm.  Additional 20 min spent reviewing chart and further documentation as well as ordering labs for follow-up after reviewing previous labs.  Each patient to whom he or she provides medical services by telemedicine is:  (1) informed of the relationship between the physician and patient and the respective role of any other health care provider with respect to management of the patient; and (2) notified that he or she may decline to receive medical services by telemedicine and may withdraw from such care at any time.    Notes:  Please see HPI     Patient ID: Raúl Francis is a 34 y.o. male.    Chief Complaint:  Here for reassessment and go over his labs.  HPI:  Generalized anxiety disorder:  Has been doing fine.  Wife trying limit his caffeine intake; has switched him to decaf.  Exercising 3 times a week earlier now up to 5 times a week recommended goal minimum of 30 min.  Has body equipment for the house.  Placed on generic Lexapro 5 mg daily for the 1st week then 10 mg a day thereafter started 02/20/2020; have also added BuSpar 7.5 mg t.i.d. as needed for anxiety; placed on Inderal 10 mg twice a day due to palpitations and suspected panic attacks.  Doing a lot better.  Limitation of caffeine intake.       Palpitations and panic attacks have cleared.  Have discussed dropping his Inderal to 10 mg twice a day to twice a day as needed for palpitations.    GERD with heartburn and gastritis.:  Has resolved to markedly improved with omeprazole 40 mg daily.  Discussed with patient changing to omeprazole 40 mg as needed daily for reflux or if the heartburn returns.    Overweight:  02/20/2020 weight 204 lb and half in oz with BMI 27.67.    Mild fatigue:  Recent 02/20/2020 labs returned back normal on thyroid screen but  with hypotestosteronism with total testosterone level 57 with normal range 304 to 1227 ng/dL and free testosterone reduced as well at 1.4 with normal range 5.1 to 41.5 pg/ mL.   Hypotestosteronism:  Desires  consult for evaluation for testosterone supplementation.  In addition to fatigue patient also with decreased libido and decreased erectile dysfunction.  Will also need to check FSH and LH levels for primary versus secondary hypotestosteronism.  Also needs CBC and CMP updated as well..    Environmental and seasonal allergies:  Patient with occasional headaches that are managed with Tylenol.  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  Claritin and Flonase help clear things up; recommended that he add Mucinex plain for headaches and congestion to thin his mucous.    GERD with heartburn and gastritis:  Has markedly improved apparently with Claritin and Flonase use as well as omeprazole; will change omeprazole to p.r.n. basis    Mixed hyperlipidemia:  Needs to adhere to low-fat high-fiber diet.  Needs updated lipid profile  Iron deficiency:  Needs updated CBC and iron levels.      Review of Systems   Constitutional: Negative for activity change and unexpected weight change.   HENT: Negative for hearing loss, rhinorrhea and trouble swallowing.    Eyes: Negative for discharge and visual disturbance.   Respiratory: Negative for chest tightness and wheezing.    Cardiovascular: Negative for chest pain and palpitations.   Gastrointestinal: Negative for blood in stool, constipation, diarrhea and vomiting.   Endocrine: Negative for polydipsia and polyuria.   Genitourinary: Negative for difficulty urinating, hematuria and urgency.   Musculoskeletal: Negative for arthralgias, joint swelling and neck pain.   Neurological: Positive for headaches. Negative for weakness.   Psychiatric/Behavioral: Negative for confusion and dysphoric mood.       Objective:      There  were no vitals filed for this visit.  There is no height or weight on file to calculate BMI.  Wt Readings from Last 3 Encounters:   02/20/20 92.5 kg (204 lb 0.6 oz)   02/14/20 102.9 kg (226 lb 11.9 oz)   02/06/19 96.8 kg (213 lb 6.5 oz)        Physical Exam   Constitutional: He is oriented to person, place, and time. He appears well-developed and well-nourished. No distress.   Alert and responsive in no apparent distress answers questions appropriately and with good thought content   HENT:   Head: Normocephalic and atraumatic.   Eyes: EOM are normal.   Neck: Normal range of motion.   Pulmonary/Chest: Effort normal. No respiratory distress.   No signs of respiratory distress or difficulty; breathing comfortably with his speech   Musculoskeletal: Normal range of motion.   Neurological: He is alert and oriented to person, place, and time.   Skin: He is not diaphoretic.   Psychiatric: He has a normal mood and affect. His behavior is normal. Judgment and thought content normal.       Assessment:       1. Generalized anxiety disorder    2. Panic attacks    3. Palpitations    4. Environmental and seasonal allergies    5. Sinus headache    6. Fatigue, unspecified type    7. Hypotestosteronism    8. Erectile dysfunction, unspecified erectile dysfunction type    9. Iron deficiency    10. Mixed hyperlipidemia    11. Overweight (BMI 25.0-29.9)    12. Gastroesophageal reflux disease, esophagitis presence not specified        Plan:       Generalized anxiety disorder: Limit caffeine intake with stress reduction and regular exercise as tolerated.  Continue escitalopram 10 mg p.o. daily and BuSpar as needed for anxiety.  Improved a fair amount with medication    Panic attacks:  He has cleared with above medication and limitation of caffeine as well as exercise; can use BuSpar as needed for panic attacks.    Palpitations:  Has cleared with above medication and limitation of caffeine as well as exercise; change Inderal to 10 mg p.o.  twice a day as needed for palpitations    Environmental and seasonal allergies:  Continue Claritin 10 mg a day as needed for congestion along with Flonase nasal spray 1-2 sprays a day for congestion.  Can use plain Mucinex to thin his mucous and for any sinus headaches; Tylenol over-the-counter for headache pain    Sinus headache:  Tylenol over-the-counter for headache pain and can use Mucinex plain to thin his mucous to help relieve his headache    Fatigue, unspecified type:  Likely multifactorial with environmental and seasonal allergies as well as hypotestosteronism, anxiety and depression.  -     Ambulatory referral/consult to Urology; Future; Expected date: 04/29/2020 Dr St.    Hypotestosteronism:  Regular exercise and stress reduction with limitation of caffeine intake; will try Viagra 50 mg 1 a day as needed for erectile dysfunction.  FSH and LH has been ordered;  consult has placed to Dr. St for evaluation for testosterone supplementation  -     Ambulatory referral/consult to Urology; Future; Expected date: 04/29/2020  -     sildenafiL (VIAGRA) 50 MG tablet; Take 1 tablet (50 mg total) by mouth daily as needed for Erectile Dysfunction. Please provide generic  Dispense: 15 tablet; Refill: 1  -     Follicle stimulating hormone; Future; Expected date: 04/22/2020  -     Luteinizing hormone; Future; Expected date: 04/22/2020  -     Hemoglobin A1c; Future; Expected date: 04/22/2020    Erectile dysfunction, unspecified erectile dysfunction type  -     Ambulatory referral/consult to Urology; Future; Expected date: 04/29/2020, Dr. St  -     sildenafiL (VIAGRA) 50 MG tablet; Take 1 tablet (50 mg total) by mouth daily as needed for Erectile Dysfunction. Please provide generic  Dispense: 15 tablet; Refill: 1  -     Follicle stimulating hormone; Future; Expected date: 04/22/2020  -     Luteinizing hormone; Future; Expected date: 04/22/2020    Iron deficiency:  Needs CBC update as well as iron  levels on ferrous gluconate 324 mg p.o. Daily  -     CBC auto differential; Future; Expected date: 04/22/2020  -     Ferritin; Future; Expected date: 04/22/2020  -     Iron and TIBC; Future; Expected date: 04/22/2020    Mixed hyperlipidemia:  Low-fat high-fiber diet with regular exercise and weight reduction  -     Lipid panel; Future; Expected date: 04/22/2020  -     Comprehensive metabolic panel; Future; Expected date: 04/22/2020  -     Hemoglobin A1c; Future; Expected date: 04/22/2020    Overweight (BMI 25.0-29.9): Weight reduction. Caloric restriction w regular exercise and weight reduction.  -     Lipid panel; Future; Expected date: 04/22/2020  -     Comprehensive metabolic panel; Future; Expected date: 04/22/2020  -     Hemoglobin A1c; Future; Expected date: 04/22/2020.    GERD:  Also with heartburn and suspected mild gastritis; resolved on omeprazole therapy; can change omeprazole to 40 mg p.o. daily as needed for the above

## 2020-04-22 NOTE — PATIENT INSTRUCTIONS
Generalized anxiety disorder: Limit caffeine intake with stress reduction and regular exercise as tolerated.  Continue escitalopram 10 mg p.o. daily and BuSpar as needed for anxiety.  Improved a fair amount with medication.    Panic attacks:  He has cleared with above medication and limitation of caffeine as well as exercise    Palpitations:  Has cleared with above medication and limitation of caffeine as well as exercise    Environmental and seasonal allergies:  Continue Claritin 10 mg a day as needed for congestion along with Flonase nasal spray 1-2 sprays a day for congestion.  Can use plain Mucinex to thin his mucous and for any sinus headaches; Tylenol over-the-counter for headache pain    Sinus headache:  Tylenol over-the-counter for headache pain and can use Mucinex plain to thin his mucous to help relieve his headache    Fatigue, unspecified type:  Likely multifactorial with environmental and seasonal allergies as well as hypotestosteronism, anxiety and depression.  -     Ambulatory referral/consult to Urology; Future; Expected date: 04/29/2020 Dr St.    Hypotestosteronism:  Regular exercise and stress reduction with limitation of caffeine intake; will try Viagra 50 mg 1 a day as needed for erectile dysfunction.  FSH and LH has been ordered;  consult has placed to Dr. St for evaluation for testosterone supplementation  -     Ambulatory referral/consult to Urology; Future; Expected date: 04/29/2020  -     sildenafiL (VIAGRA) 50 MG tablet; Take 1 tablet (50 mg total) by mouth daily as needed for Erectile Dysfunction. Please provide generic  Dispense: 15 tablet; Refill: 1  -     Follicle stimulating hormone; Future; Expected date: 04/22/2020  -     Luteinizing hormone; Future; Expected date: 04/22/2020  -     Hemoglobin A1c; Future; Expected date: 04/22/2020    Erectile dysfunction, unspecified erectile dysfunction type  -     Ambulatory referral/consult to Urology; Future; Expected date:  04/29/2020, Dr. St  -     sildenafiL (VIAGRA) 50 MG tablet; Take 1 tablet (50 mg total) by mouth daily as needed for Erectile Dysfunction. Please provide generic  Dispense: 15 tablet; Refill: 1  -     Follicle stimulating hormone; Future; Expected date: 04/22/2020  -     Luteinizing hormone; Future; Expected date: 04/22/2020    Iron deficiency:  Needs CBC update as well as iron levels on ferrous gluconate 324 mg p.o. Daily  -     CBC auto differential; Future; Expected date: 04/22/2020  -     Ferritin; Future; Expected date: 04/22/2020  -     Iron and TIBC; Future; Expected date: 04/22/2020    Mixed hyperlipidemia:  Low-fat high-fiber diet with regular exercise and weight reduction  -     Lipid panel; Future; Expected date: 04/22/2020  -     Comprehensive metabolic panel; Future; Expected date: 04/22/2020  -     Hemoglobin A1c; Future; Expected date: 04/22/2020    Overweight (BMI 25.0-29.9): Weight reduction. Caloric restriction w regular exercise and weight reduction.  -     Lipid panel; Future; Expected date: 04/22/2020  -     Comprehensive metabolic panel; Future; Expected date: 04/22/2020  -     Hemoglobin A1c; Future; Expected date: 04/22/2020    GERD/gastroesophageal reflux disease:  Also with heartburn and suspected mild gastritis; resolved on omeprazole therapy; can change omeprazole to 40 mg p.o. daily as needed for the above

## 2020-04-22 NOTE — Clinical Note
Please schedule appointment to see me in follow-up as a virtual visit in 4 weeks; please help schedule patient's labs to be obtained 1 week before follow-up after an overnight fast of 8 hr; please also notify patient that his after visit summary will be ready for him to you in his portal

## 2020-04-23 RX ORDER — LORATADINE 10 MG/1
TABLET ORAL
Qty: 30 TABLET | Refills: 1 | Status: SHIPPED | OUTPATIENT
Start: 2020-04-23 | End: 2022-10-28

## 2020-04-23 NOTE — PROGRESS NOTES
Called pt twice and left message on his cell to inform him of appt and to let him know he can see after visit summary on the portal

## 2020-05-22 ENCOUNTER — TELEPHONE (OUTPATIENT)
Dept: FAMILY MEDICINE | Facility: CLINIC | Age: 35
End: 2020-05-22

## 2020-05-22 ENCOUNTER — OFFICE VISIT (OUTPATIENT)
Dept: FAMILY MEDICINE | Facility: CLINIC | Age: 35
End: 2020-05-22
Payer: COMMERCIAL

## 2020-05-22 VITALS — WEIGHT: 210 LBS | BODY MASS INDEX: 28.48 KG/M2

## 2020-05-22 DIAGNOSIS — E78.2 MIXED HYPERLIPIDEMIA: ICD-10-CM

## 2020-05-22 DIAGNOSIS — F32.9 REACTIVE DEPRESSION: ICD-10-CM

## 2020-05-22 DIAGNOSIS — F41.0 PANIC ATTACKS: ICD-10-CM

## 2020-05-22 DIAGNOSIS — R00.2 PALPITATIONS: ICD-10-CM

## 2020-05-22 DIAGNOSIS — F45.8 DIZZINESS, PSYCHOGENIC: ICD-10-CM

## 2020-05-22 DIAGNOSIS — F41.1 GAD (GENERALIZED ANXIETY DISORDER): Primary | ICD-10-CM

## 2020-05-22 DIAGNOSIS — E66.3 OVERWEIGHT (BMI 25.0-29.9): ICD-10-CM

## 2020-05-22 PROCEDURE — 99214 PR OFFICE/OUTPT VISIT, EST, LEVL IV, 30-39 MIN: ICD-10-PCS | Mod: 95,,, | Performed by: INTERNAL MEDICINE

## 2020-05-22 PROCEDURE — 3008F BODY MASS INDEX DOCD: CPT | Mod: CPTII,,, | Performed by: INTERNAL MEDICINE

## 2020-05-22 PROCEDURE — 99214 OFFICE O/P EST MOD 30 MIN: CPT | Mod: 95,,, | Performed by: INTERNAL MEDICINE

## 2020-05-22 PROCEDURE — 3008F PR BODY MASS INDEX (BMI) DOCUMENTED: ICD-10-PCS | Mod: CPTII,,, | Performed by: INTERNAL MEDICINE

## 2020-05-22 RX ORDER — ESCITALOPRAM OXALATE 10 MG/1
TABLET ORAL
Qty: 90 TABLET | Refills: 1 | Status: SHIPPED | OUTPATIENT
Start: 2020-05-22 | End: 2022-10-28

## 2020-05-22 NOTE — PATIENT INSTRUCTIONS
GOLDIE (generalized anxiety disorder): Limit caffeine intake with stress reduction and regular exercise as tolerated.  Continue escitalopram 10 mg daily; patient has BuSpar to use as needed for anxiety.  Regular exercise and limitation of caffeine.  -     escitalopram oxalate (LEXAPRO) 10 MG tablet; Take 10 mg po every morning  Dispense: 90 tablet; Refill: 1    Panic attacks:  Have gone by the wayside; has BuSpar to use as needed for panic attacks p.r.n. limit caffeine intake.  -     escitalopram oxalate (LEXAPRO) 10 MG tablet; Take 10 mg po every morning  Dispense: 90 tablet; Refill: 1    Palpitations:  Have cleared with medications; on escitalopram 10 mg p.o. daily and has BuSpar to use as needed for panic attacks/palpitations.    Reactive depression:  Has cleared with medication doing well.  -     escitalopram oxalate (LEXAPRO) 10 MG tablet; Take 10 mg po every morning  Dispense: 90 tablet; Refill: 1    Dizziness, psychogenic:  Has cleared with medications; limit caffeine intake and exercise regularly  -     escitalopram oxalate (LEXAPRO) 10 MG tablet; Take 10 mg po every morning  Dispense: 90 tablet; Refill: 1    Mixed hyperlipidemia: Maintain low fat high fiber diet, exercise regularly. Weight reduction where indicated.  Lipid panel is pending    Overweight (BMI 25.0-29.9): Caloric restriction w regular exercise and weight reduction.

## 2020-05-22 NOTE — PROGRESS NOTES
Subjective:      The patient location is:  [place]; [place]  The chief complaint leading to consultation is:  Reassessment and review medications    Visit type: audiovisual    Face to Face time with patient:  3:00 p.m. to 3:25 pm; greater than 50% of time spent in discussion counseling and review.  45 minutes of total time spent on the encounter, which includes face to face time and non-face to face time preparing to see the patient (eg, review of tests), Obtaining and/or reviewing separately obtained history, Documenting clinical information in the electronic or other health record, Independently interpreting results (not separately reported) and communicating results to the patient/family/caregiver, or Care coordination (not separately reported).       Each patient to whom he or she provides medical services by telemedicine is:  (1) informed of the relationship between the physician and patient and the respective role of any other health care provider with respect to management of the patient; and (2) notified that he or she may decline to receive medical services by telemedicine and may withdraw from such care at any time.    Notes:  Please see below.       Patient ID: Raúl Francis is a 34 y.o. male.    Chief Complaint:  Reassessment for general anxiety disorder/panic attacks/palpitations.  HPI :  General anxiety disorder:  Patient is doing a lot better on escitalopram 10 mg p.o. daily; he also has BuSpar 7.5 mg 3 times a day as needed for anxiety to use.  Knows to limit his caffeine intake and exercise regularly.  Patient was reportedly off last week from work has been recharging.  Doing well; occasionally uses BuSpar.  On escitalopram 10 mg per day and helps a lot.  Panic attacks/palpitations:  Have cleared; has BuSpar to use if needed; knows to limit caffeine intake.  Panic attacks have been gone by the wayside.  Depression:  Escitalopram 10 mg per day is helping a fair amount.  Depression has cleared as  well.  Hypotestosteronism:  FSH and LH are pending for further evaluation.  BLAISE St has been consulted for testosterone supplementation evaluation.  Fatigue:  Has improved with medications; gym helps.  Psychogenic dizziness:  Has cleared with medications as well.  Knows to limit caffeine intake.  Mixed hyperlipidemia:  On low-fat high-fiber diet or tries.  Lipid panel is pending still.  Regular exercise and weight reduction will also help.    Review of Systems   Constitutional: Negative for fever and unexpected weight change.   HENT: Negative for congestion, postnasal drip and rhinorrhea.    Respiratory: Negative for cough, chest tightness, shortness of breath and wheezing.    Cardiovascular: Negative for chest pain, palpitations and leg swelling.   Gastrointestinal: Negative for abdominal pain, blood in stool, constipation, diarrhea, nausea and vomiting.   Genitourinary: Negative for dysuria and hematuria.   Psychiatric/Behavioral: Negative for dysphoric mood. The patient is not nervous/anxious.        Objective:       Vital signs:  Blood pressure and pulse are not available; patient alert and responsive in no apparent distress.  Answers questions appropriately with good thought content.    Vitals:    05/22/20 0900   Weight: 95.3 kg (210 lb)     Body mass index is 28.48 kg/m².  Wt Readings from Last 3 Encounters:   05/22/20 95.3 kg (210 lb)   02/20/20 92.5 kg (204 lb 0.6 oz)   02/14/20 102.9 kg (226 lb 11.9 oz)        Physical Exam   Constitutional: He is oriented to person, place, and time. He appears well-developed and well-nourished. No distress.   Patient is alert responsive in no apparent distress answers questions appropriately with good thought content.   HENT:   Head: Normocephalic and atraumatic.   Eyes: EOM are normal.   Pulmonary/Chest: Effort normal. No respiratory distress.   Musculoskeletal: Normal range of motion.   Neurological: He is alert and oriented to person, place, and time.   Skin:  He is not diaphoretic.   Psychiatric: He has a normal mood and affect. His behavior is normal.       Assessment:       1. GOLDIE (generalized anxiety disorder)    2. Panic attacks    3. Palpitations    4. Reactive depression    5. Dizziness, psychogenic    6. Mixed hyperlipidemia    7. Overweight (BMI 25.0-29.9)        Plan:       GOLDIE (generalized anxiety disorder): Limit caffeine intake with stress reduction and regular exercise as tolerated.  Continue escitalopram 10 mg daily; patient has BuSpar to use as needed for anxiety.  Regular exercise and limitation of caffeine.  -     escitalopram oxalate (LEXAPRO) 10 MG tablet; Take 10 mg po every morning  Dispense: 90 tablet; Refill: 1    Panic attacks:  Have gone by the wayside; has BuSpar to use as needed for panic attacks p.r.n. limit caffeine intake.  -     escitalopram oxalate (LEXAPRO) 10 MG tablet; Take 10 mg po every morning  Dispense: 90 tablet; Refill: 1    Palpitations:  Have cleared with medications; on escitalopram 10 mg p.o. daily and has BuSpar to use as needed for panic attacks/palpitations.    Reactive depression:  Has cleared with medication doing well.  -     escitalopram oxalate (LEXAPRO) 10 MG tablet; Take 10 mg po every morning  Dispense: 90 tablet; Refill: 1    Dizziness, psychogenic:  Has cleared with medications; limit caffeine intake and exercise regularly  -     escitalopram oxalate (LEXAPRO) 10 MG tablet; Take 10 mg po every morning  Dispense: 90 tablet; Refill: 1    Mixed hyperlipidemia: Maintain low fat high fiber diet, exercise regularly. Weight reduction where indicated.  Lipid panel is pending    Overweight (BMI 25.0-29.9): Caloric restriction w regular exercise and weight reduction.

## 2020-05-22 NOTE — Clinical Note
Please line up patient's follow-up in 3 months from the virtual visit from today.  Please have him obtain his labs after an overnight fast of 8 hr.  Prior to his follow-up visit by 1 week.  Labs as were ordered 04/22/2020

## 2020-05-22 NOTE — TELEPHONE ENCOUNTER
----- Message from Flaco Marlow sent at 5/22/2020  2:47 PM CDT -----  Contact: same  Patient called in and stated he had a Virtual at 2:20pm today but signed out.  Patient would like a call back at [phone]

## 2020-05-25 ENCOUNTER — TELEPHONE (OUTPATIENT)
Dept: FAMILY MEDICINE | Facility: CLINIC | Age: 35
End: 2020-05-25

## 2020-05-25 NOTE — TELEPHONE ENCOUNTER
----- Message from Cristobal Reynolds MD sent at 5/22/2020  3:24 PM CDT -----  Please line up patient's follow-up in 3 months from the virtual visit from today.  Please have him obtain his labs after an overnight fast of 8 hr.  Prior to his follow-up visit by 1 week.  Labs as were ordered 04/22/2020

## 2020-05-26 ENCOUNTER — TELEPHONE (OUTPATIENT)
Dept: FAMILY MEDICINE | Facility: CLINIC | Age: 35
End: 2020-05-26

## 2020-05-29 DIAGNOSIS — T88.7XXA SIDE EFFECT OF MEDICATION: ICD-10-CM

## 2020-05-29 DIAGNOSIS — R12 HEARTBURN: ICD-10-CM

## 2020-05-29 DIAGNOSIS — R11.0 NAUSEA: ICD-10-CM

## 2020-05-29 DIAGNOSIS — K29.00 ACUTE GASTRITIS, PRESENCE OF BLEEDING UNSPECIFIED, UNSPECIFIED GASTRITIS TYPE: ICD-10-CM

## 2020-05-29 RX ORDER — OMEPRAZOLE 40 MG/1
CAPSULE, DELAYED RELEASE ORAL
Qty: 90 CAPSULE | Refills: 1 | Status: SHIPPED | OUTPATIENT
Start: 2020-05-29 | End: 2021-02-26

## 2020-06-04 DIAGNOSIS — F41.0 PANIC ATTACKS: ICD-10-CM

## 2020-06-04 RX ORDER — PROPRANOLOL HYDROCHLORIDE 10 MG/1
TABLET ORAL
Qty: 60 TABLET | Refills: 0 | Status: SHIPPED | OUTPATIENT
Start: 2020-06-04 | End: 2022-10-28

## 2020-07-27 ENCOUNTER — LAB VISIT (OUTPATIENT)
Dept: LAB | Facility: HOSPITAL | Age: 35
End: 2020-07-27
Attending: INTERNAL MEDICINE
Payer: COMMERCIAL

## 2020-07-27 DIAGNOSIS — E34.9 HYPOTESTOSTERONISM: ICD-10-CM

## 2020-07-27 DIAGNOSIS — E66.3 OVERWEIGHT (BMI 25.0-29.9): ICD-10-CM

## 2020-07-27 DIAGNOSIS — E78.2 MIXED HYPERLIPIDEMIA: ICD-10-CM

## 2020-07-27 DIAGNOSIS — N52.9 ERECTILE DYSFUNCTION, UNSPECIFIED ERECTILE DYSFUNCTION TYPE: ICD-10-CM

## 2020-07-27 DIAGNOSIS — E61.1 IRON DEFICIENCY: ICD-10-CM

## 2020-07-27 LAB
BASOPHILS # BLD AUTO: 0.06 K/UL (ref 0–0.2)
BASOPHILS NFR BLD: 0.8 % (ref 0–1.9)
DIFFERENTIAL METHOD: ABNORMAL
EOSINOPHIL # BLD AUTO: 0.3 K/UL (ref 0–0.5)
EOSINOPHIL NFR BLD: 3.8 % (ref 0–8)
ERYTHROCYTE [DISTWIDTH] IN BLOOD BY AUTOMATED COUNT: 12.8 % (ref 11.5–14.5)
HCT VFR BLD AUTO: 43.1 % (ref 40–54)
HGB BLD-MCNC: 13.6 G/DL (ref 14–18)
IMM GRANULOCYTES # BLD AUTO: 0.02 K/UL (ref 0–0.04)
IMM GRANULOCYTES NFR BLD AUTO: 0.3 % (ref 0–0.5)
LYMPHOCYTES # BLD AUTO: 3.2 K/UL (ref 1–4.8)
LYMPHOCYTES NFR BLD: 41.2 % (ref 18–48)
MCH RBC QN AUTO: 29.4 PG (ref 27–31)
MCHC RBC AUTO-ENTMCNC: 31.6 G/DL (ref 32–36)
MCV RBC AUTO: 93 FL (ref 82–98)
MONOCYTES # BLD AUTO: 0.5 K/UL (ref 0.3–1)
MONOCYTES NFR BLD: 6.8 % (ref 4–15)
NEUTROPHILS # BLD AUTO: 3.6 K/UL (ref 1.8–7.7)
NEUTROPHILS NFR BLD: 47.1 % (ref 38–73)
NRBC BLD-RTO: 0 /100 WBC
PLATELET # BLD AUTO: 248 K/UL (ref 150–350)
PMV BLD AUTO: 11.7 FL (ref 9.2–12.9)
RBC # BLD AUTO: 4.62 M/UL (ref 4.6–6.2)
WBC # BLD AUTO: 7.69 K/UL (ref 3.9–12.7)

## 2020-07-27 PROCEDURE — 36415 COLL VENOUS BLD VENIPUNCTURE: CPT | Mod: PN

## 2020-07-27 PROCEDURE — 83001 ASSAY OF GONADOTROPIN (FSH): CPT

## 2020-07-27 PROCEDURE — 80053 COMPREHEN METABOLIC PANEL: CPT

## 2020-07-27 PROCEDURE — 83540 ASSAY OF IRON: CPT

## 2020-07-27 PROCEDURE — 82728 ASSAY OF FERRITIN: CPT

## 2020-07-27 PROCEDURE — 85025 COMPLETE CBC W/AUTO DIFF WBC: CPT

## 2020-07-27 PROCEDURE — 83002 ASSAY OF GONADOTROPIN (LH): CPT

## 2020-07-27 PROCEDURE — 83036 HEMOGLOBIN GLYCOSYLATED A1C: CPT

## 2020-07-27 PROCEDURE — 80061 LIPID PANEL: CPT

## 2020-07-28 LAB
ALBUMIN SERPL BCP-MCNC: 4.1 G/DL (ref 3.5–5.2)
ALP SERPL-CCNC: 67 U/L (ref 55–135)
ALT SERPL W/O P-5'-P-CCNC: 24 U/L (ref 10–44)
ANION GAP SERPL CALC-SCNC: 9 MMOL/L (ref 8–16)
AST SERPL-CCNC: 25 U/L (ref 10–40)
BILIRUB SERPL-MCNC: 0.4 MG/DL (ref 0.1–1)
BUN SERPL-MCNC: 13 MG/DL (ref 6–20)
CALCIUM SERPL-MCNC: 9.8 MG/DL (ref 8.7–10.5)
CHLORIDE SERPL-SCNC: 106 MMOL/L (ref 95–110)
CHOLEST SERPL-MCNC: 196 MG/DL (ref 120–199)
CHOLEST/HDLC SERPL: 3.4 {RATIO} (ref 2–5)
CO2 SERPL-SCNC: 26 MMOL/L (ref 23–29)
CREAT SERPL-MCNC: 0.9 MG/DL (ref 0.5–1.4)
EST. GFR  (AFRICAN AMERICAN): >60 ML/MIN/1.73 M^2
EST. GFR  (NON AFRICAN AMERICAN): >60 ML/MIN/1.73 M^2
ESTIMATED AVG GLUCOSE: 111 MG/DL (ref 68–131)
FERRITIN SERPL-MCNC: 63 NG/ML (ref 20–300)
FSH SERPL-ACNC: 3.7 MIU/ML (ref 0.95–11.95)
GLUCOSE SERPL-MCNC: 100 MG/DL (ref 70–110)
HBA1C MFR BLD HPLC: 5.5 % (ref 4–5.6)
HDLC SERPL-MCNC: 57 MG/DL (ref 40–75)
HDLC SERPL: 29.1 % (ref 20–50)
IRON SERPL-MCNC: 80 UG/DL (ref 45–160)
LDLC SERPL CALC-MCNC: 116 MG/DL (ref 63–159)
LH SERPL-ACNC: 1.4 MIU/ML (ref 0.6–12.1)
NONHDLC SERPL-MCNC: 139 MG/DL
POTASSIUM SERPL-SCNC: 3.7 MMOL/L (ref 3.5–5.1)
PROT SERPL-MCNC: 7.2 G/DL (ref 6–8.4)
SATURATED IRON: 18 % (ref 20–50)
SODIUM SERPL-SCNC: 141 MMOL/L (ref 136–145)
TOTAL IRON BINDING CAPACITY: 444 UG/DL (ref 250–450)
TRANSFERRIN SERPL-MCNC: 300 MG/DL (ref 200–375)
TRIGL SERPL-MCNC: 115 MG/DL (ref 30–150)

## 2020-08-04 ENCOUNTER — OFFICE VISIT (OUTPATIENT)
Dept: FAMILY MEDICINE | Facility: CLINIC | Age: 35
End: 2020-08-04
Payer: COMMERCIAL

## 2020-08-04 VITALS
HEART RATE: 72 BPM | WEIGHT: 218.13 LBS | OXYGEN SATURATION: 97 % | TEMPERATURE: 98 F | SYSTOLIC BLOOD PRESSURE: 112 MMHG | HEIGHT: 72 IN | BODY MASS INDEX: 29.54 KG/M2 | DIASTOLIC BLOOD PRESSURE: 82 MMHG

## 2020-08-04 DIAGNOSIS — D64.9 NORMOCYTIC ANEMIA: ICD-10-CM

## 2020-08-04 DIAGNOSIS — F41.1 GENERALIZED ANXIETY DISORDER: ICD-10-CM

## 2020-08-04 DIAGNOSIS — R63.5 WEIGHT GAIN: ICD-10-CM

## 2020-08-04 DIAGNOSIS — R00.2 PALPITATIONS: ICD-10-CM

## 2020-08-04 DIAGNOSIS — E66.3 OVERWEIGHT (BMI 25.0-29.9): ICD-10-CM

## 2020-08-04 DIAGNOSIS — F41.0 PANIC ATTACKS: ICD-10-CM

## 2020-08-04 DIAGNOSIS — E61.1 IRON DEFICIENCY: ICD-10-CM

## 2020-08-04 DIAGNOSIS — R73.01 IMPAIRED FASTING GLUCOSE: ICD-10-CM

## 2020-08-04 DIAGNOSIS — E78.2 MIXED HYPERLIPIDEMIA: Primary | ICD-10-CM

## 2020-08-04 DIAGNOSIS — E34.9 HYPOTESTOSTERONISM: ICD-10-CM

## 2020-08-04 PROCEDURE — 99999 PR PBB SHADOW E&M-EST. PATIENT-LVL III: CPT | Mod: PBBFAC,,, | Performed by: INTERNAL MEDICINE

## 2020-08-04 PROCEDURE — 99214 OFFICE O/P EST MOD 30 MIN: CPT | Mod: S$GLB,,, | Performed by: INTERNAL MEDICINE

## 2020-08-04 PROCEDURE — 3008F BODY MASS INDEX DOCD: CPT | Mod: CPTII,S$GLB,, | Performed by: INTERNAL MEDICINE

## 2020-08-04 PROCEDURE — 99214 PR OFFICE/OUTPT VISIT, EST, LEVL IV, 30-39 MIN: ICD-10-PCS | Mod: S$GLB,,, | Performed by: INTERNAL MEDICINE

## 2020-08-04 PROCEDURE — 99999 PR PBB SHADOW E&M-EST. PATIENT-LVL III: ICD-10-PCS | Mod: PBBFAC,,, | Performed by: INTERNAL MEDICINE

## 2020-08-04 PROCEDURE — 3008F PR BODY MASS INDEX (BMI) DOCUMENTED: ICD-10-PCS | Mod: CPTII,S$GLB,, | Performed by: INTERNAL MEDICINE

## 2020-08-04 NOTE — PROGRESS NOTES
Subjective:       Patient ID: Raúl Francis is a 34 y.o. male.    Chief Complaint: Follow-up (review lab results)    HPI   Here for reassessment and to go over his labs.   Mix HLP: on low fat high fiber or at least tries; but has gained about 18 lbs since 5/22/20.   Overweight: has gained some weight as above. Exercise goal 5x a week; now back at gym. Min 30 min a day. Smaller portions.   GOLDIE: overall has been doing fine. Lexapro 10 mg a day. Has buspar to use as needed.   Panic attacks: has been quiet,   Iron deficiency: fergon 324 mg a day. Men's MVI one a day.   Low testosterone levels w low nl FSH/LH levels; will repeat testosterone levels b efore sending to urology for testoaterone supplements and endocrine for pituitary w/u.   Total time: 840-910: >50% time spent on discussion, counseling, and review;labs discussed and ordered for f/u; meds reviewed and addressed.       Review of Systems   Constitutional: Negative for appetite change and unexpected weight change.   HENT: Negative for congestion, postnasal drip, rhinorrhea and sinus pressure.         Denies seasonal allergies, or perennial allergies   Eyes: Negative for discharge and itching.   Respiratory: Negative for cough, chest tightness, shortness of breath and wheezing.    Cardiovascular: Negative for chest pain, palpitations and leg swelling.   Gastrointestinal: Negative for abdominal pain, blood in stool, constipation, diarrhea, nausea and vomiting.   Endocrine: Negative for polydipsia, polyphagia and polyuria.   Genitourinary: Negative for dysuria and hematuria.   Musculoskeletal: Negative for arthralgias and myalgias.   Skin: Negative for rash.   Allergic/Immunologic: Negative for environmental allergies and food allergies.   Neurological: Negative for tremors, seizures and headaches.   Hematological: Negative for adenopathy. Does not bruise/bleed easily.   Psychiatric/Behavioral: Negative for dysphoric mood and sleep disturbance. The patient is  not nervous/anxious.         Denies anxiety or depression.       Objective:      Vitals:    08/04/20 0814   BP: 112/82   BP Location: Right arm   Patient Position: Sitting   BP Method: Large (Manual)   Pulse: 72   Temp: 98.2 °F (36.8 °C)   TempSrc: Temporal   SpO2: 97%   Weight: 98.9 kg (218 lb 2.3 oz)   Height: 6' (1.829 m)     Body mass index is 29.59 kg/m².  Wt Readings from Last 3 Encounters:   08/04/20 98.9 kg (218 lb 2.3 oz)   05/22/20 95.3 kg (210 lb)   02/20/20 92.5 kg (204 lb 0.6 oz)        Physical Exam  Vitals signs reviewed.   Constitutional:       Appearance: He is well-developed.   HENT:      Head: Normocephalic and atraumatic.   Neck:      Musculoskeletal: Normal range of motion and neck supple.      Thyroid: No thyromegaly.   Cardiovascular:      Rate and Rhythm: Normal rate and regular rhythm.      Heart sounds: Normal heart sounds. No murmur. No gallop.    Pulmonary:      Effort: Pulmonary effort is normal. No respiratory distress.      Breath sounds: Normal breath sounds. No wheezing or rales.   Abdominal:      General: Bowel sounds are normal. There is no distension.      Palpations: Abdomen is soft.      Tenderness: There is no abdominal tenderness. There is no guarding or rebound.   Musculoskeletal: Normal range of motion.   Lymphadenopathy:      Cervical: No cervical adenopathy.   Skin:     Findings: No rash.   Neurological:      Mental Status: He is alert and oriented to person, place, and time.      Comments: Moves all 4 extremities fine.   Psychiatric:         Behavior: Behavior normal.         Thought Content: Thought content normal.         Assessment:       1. Mixed hyperlipidemia    2. Overweight (BMI 25.0-29.9)    3. Generalized anxiety disorder    4. Panic attacks    5. Palpitations    6. Iron deficiency    7. Normocytic anemia    8. Hypotestosteronism    9. Weight gain    10. Impaired fasting glucose        Plan:       Mixed hyperlipidemia: Maintain low fat high fiber diet, exercise  regularly. Weight reduction where indicated.  -     Lipid Panel; Future; Expected date: 08/04/2020  -     TSH; Future; Expected date: 08/04/2020  -     T4, free; Future; Expected date: 08/04/2020    Overweight (BMI 25.0-29.9): Caloric restriction w regular exercise and weight reduction. Resuming workouts now.  -     Glucose, fasting; Future; Expected date: 08/18/2020  -     TSH; Future; Expected date: 08/04/2020  -     T4, free; Future; Expected date: 08/04/2020    Generalized anxiety disorder: Limit caffeine intake with stress reduction and regular exercise as tolerated. Cont lexapro and buspar as needed.   -     TSH; Future; Expected date: 08/04/2020  -     T4, free; Future; Expected date: 08/04/2020    Panic attacks: have been quiet.   -     TSH; Future; Expected date: 08/04/2020  -     T4, free; Future; Expected date: 08/04/2020    Palpitations: has been quiet as well; limits caffeine. Has stopped.     Iron deficiency: cont supplements daily w MVI.   -     CBC auto differential; Future; Expected date: 08/04/2020  -     Ferritin; Future; Expected date: 08/04/2020  -     Iron and TIBC; Future; Expected date: 08/04/2020    Normocytic anemia  -     CBC auto differential; Future; Expected date: 08/04/2020  -     Reticulocytes; Future; Expected date: 08/04/2020  -     TSH; Future; Expected date: 08/04/2020  -     T4, free; Future; Expected date: 08/04/2020    Hypotestosteronism  -     Testosterone; Future; Expected date: 08/04/2020  -     Testosterone, Free; Future; Expected date: 08/04/2020  -     TSH; Future; Expected date: 08/04/2020  -     T4, free; Future; Expected date: 08/04/2020    Weight gain: has resumed workouts as prior.   -     Testosterone; Future; Expected date: 08/04/2020  -     Testosterone, Free; Future; Expected date: 08/04/2020  -     TSH; Future; Expected date: 08/04/2020  -     T4, free; Future; Expected date: 08/04/2020    Impaired fast glucose: Exercise recommended with weight reduction and low  carb diet; we'll follow hemoglobin A1c's with you periodically.

## 2020-08-04 NOTE — PATIENT INSTRUCTIONS
Mixed hyperlipidemia: Maintain low fat high fiber diet, exercise regularly. Weight reduction where indicated.  -     Lipid Panel; Future; Expected date: 08/04/2020  -     TSH; Future; Expected date: 08/04/2020  -     T4, free; Future; Expected date: 08/04/2020    Overweight (BMI 25.0-29.9): Caloric restriction w regular exercise and weight reduction. Resuming workouts now.  -     Glucose, fasting; Future; Expected date: 08/18/2020  -     TSH; Future; Expected date: 08/04/2020  -     T4, free; Future; Expected date: 08/04/2020    Generalized anxiety disorder: Limit caffeine intake with stress reduction and regular exercise as tolerated. Cont lexapro and buspar as needed.   -     TSH; Future; Expected date: 08/04/2020  -     T4, free; Future; Expected date: 08/04/2020    Panic attacks: have been quiet.   -     TSH; Future; Expected date: 08/04/2020  -     T4, free; Future; Expected date: 08/04/2020    Palpitations: has been quiet as well; limits caffeine. Has stopped.     Iron deficiency: cont supplements daily w MVI.   -     CBC auto differential; Future; Expected date: 08/04/2020  -     Ferritin; Future; Expected date: 08/04/2020  -     Iron and TIBC; Future; Expected date: 08/04/2020    Normocytic anemia  -     CBC auto differential; Future; Expected date: 08/04/2020  -     Reticulocytes; Future; Expected date: 08/04/2020  -     TSH; Future; Expected date: 08/04/2020  -     T4, free; Future; Expected date: 08/04/2020    Hypotestosteronism  -     Testosterone; Future; Expected date: 08/04/2020  -     Testosterone, Free; Future; Expected date: 08/04/2020  -     TSH; Future; Expected date: 08/04/2020  -     T4, free; Future; Expected date: 08/04/2020    Weight gain: has resumed workouts as prior.   -     Testosterone; Future; Expected date: 08/04/2020  -     Testosterone, Free; Future; Expected date: 08/04/2020  -     TSH; Future; Expected date: 08/04/2020  -     T4, free; Future; Expected date: 08/04/2020    Impaired  fast glucose: Exercise recommended with weight reduction and low carb diet; we'll follow hemoglobin A1c's with you periodically.

## 2021-02-22 DIAGNOSIS — R12 HEARTBURN: ICD-10-CM

## 2021-02-22 DIAGNOSIS — R11.0 NAUSEA: ICD-10-CM

## 2021-02-22 DIAGNOSIS — K29.00 ACUTE GASTRITIS, PRESENCE OF BLEEDING UNSPECIFIED, UNSPECIFIED GASTRITIS TYPE: ICD-10-CM

## 2021-02-22 DIAGNOSIS — T88.7XXA SIDE EFFECT OF MEDICATION: ICD-10-CM

## 2021-02-26 RX ORDER — OMEPRAZOLE 40 MG/1
CAPSULE, DELAYED RELEASE ORAL
Qty: 90 CAPSULE | Refills: 1 | Status: SHIPPED | OUTPATIENT
Start: 2021-02-26 | End: 2021-09-09

## 2021-05-06 ENCOUNTER — PATIENT MESSAGE (OUTPATIENT)
Dept: RESEARCH | Facility: HOSPITAL | Age: 36
End: 2021-05-06

## 2021-09-08 DIAGNOSIS — R12 HEARTBURN: ICD-10-CM

## 2021-09-08 DIAGNOSIS — K29.00 ACUTE GASTRITIS, PRESENCE OF BLEEDING UNSPECIFIED, UNSPECIFIED GASTRITIS TYPE: ICD-10-CM

## 2021-09-08 DIAGNOSIS — R11.0 NAUSEA: ICD-10-CM

## 2021-09-08 DIAGNOSIS — T88.7XXA SIDE EFFECT OF MEDICATION: ICD-10-CM

## 2021-09-09 RX ORDER — OMEPRAZOLE 40 MG/1
CAPSULE, DELAYED RELEASE ORAL
Qty: 90 CAPSULE | Refills: 0 | Status: SHIPPED | OUTPATIENT
Start: 2021-09-09 | End: 2021-12-17

## 2021-12-17 ENCOUNTER — PATIENT MESSAGE (OUTPATIENT)
Dept: FAMILY MEDICINE | Facility: CLINIC | Age: 36
End: 2021-12-17
Payer: COMMERCIAL

## 2021-12-17 DIAGNOSIS — K29.00 ACUTE GASTRITIS, PRESENCE OF BLEEDING UNSPECIFIED, UNSPECIFIED GASTRITIS TYPE: ICD-10-CM

## 2021-12-17 DIAGNOSIS — R11.0 NAUSEA: ICD-10-CM

## 2021-12-17 DIAGNOSIS — R12 HEARTBURN: ICD-10-CM

## 2021-12-17 DIAGNOSIS — T88.7XXA SIDE EFFECT OF MEDICATION: ICD-10-CM

## 2021-12-17 RX ORDER — OMEPRAZOLE 40 MG/1
CAPSULE, DELAYED RELEASE ORAL
Qty: 30 CAPSULE | Refills: 0 | Status: SHIPPED | OUTPATIENT
Start: 2021-12-17 | End: 2022-10-28

## 2022-01-07 ENCOUNTER — OFFICE VISIT (OUTPATIENT)
Dept: URGENT CARE | Facility: CLINIC | Age: 37
End: 2022-01-07
Payer: COMMERCIAL

## 2022-01-07 VITALS
BODY MASS INDEX: 27.77 KG/M2 | SYSTOLIC BLOOD PRESSURE: 129 MMHG | TEMPERATURE: 99 F | DIASTOLIC BLOOD PRESSURE: 81 MMHG | RESPIRATION RATE: 16 BRPM | HEART RATE: 74 BPM | WEIGHT: 205 LBS | OXYGEN SATURATION: 98 % | HEIGHT: 72 IN

## 2022-01-07 DIAGNOSIS — U07.1 COVID-19: Primary | ICD-10-CM

## 2022-01-07 DIAGNOSIS — R50.9 FEVER, UNSPECIFIED FEVER CAUSE: ICD-10-CM

## 2022-01-07 LAB
CTP QC/QA: YES
SARS-COV-2 RDRP RESP QL NAA+PROBE: POSITIVE

## 2022-01-07 PROCEDURE — 3074F SYST BP LT 130 MM HG: CPT | Mod: CPTII,S$GLB,, | Performed by: FAMILY MEDICINE

## 2022-01-07 PROCEDURE — 1160F PR REVIEW ALL MEDS BY PRESCRIBER/CLIN PHARMACIST DOCUMENTED: ICD-10-PCS | Mod: CPTII,S$GLB,, | Performed by: FAMILY MEDICINE

## 2022-01-07 PROCEDURE — 1159F PR MEDICATION LIST DOCUMENTED IN MEDICAL RECORD: ICD-10-PCS | Mod: CPTII,S$GLB,, | Performed by: FAMILY MEDICINE

## 2022-01-07 PROCEDURE — 99214 PR OFFICE/OUTPT VISIT, EST, LEVL IV, 30-39 MIN: ICD-10-PCS | Mod: S$GLB,,, | Performed by: FAMILY MEDICINE

## 2022-01-07 PROCEDURE — 3008F PR BODY MASS INDEX (BMI) DOCUMENTED: ICD-10-PCS | Mod: CPTII,S$GLB,, | Performed by: FAMILY MEDICINE

## 2022-01-07 PROCEDURE — 3079F DIAST BP 80-89 MM HG: CPT | Mod: CPTII,S$GLB,, | Performed by: FAMILY MEDICINE

## 2022-01-07 PROCEDURE — 3074F PR MOST RECENT SYSTOLIC BLOOD PRESSURE < 130 MM HG: ICD-10-PCS | Mod: CPTII,S$GLB,, | Performed by: FAMILY MEDICINE

## 2022-01-07 PROCEDURE — U0002 COVID-19 LAB TEST NON-CDC: HCPCS | Mod: QW,CR,S$GLB, | Performed by: FAMILY MEDICINE

## 2022-01-07 PROCEDURE — 3008F BODY MASS INDEX DOCD: CPT | Mod: CPTII,S$GLB,, | Performed by: FAMILY MEDICINE

## 2022-01-07 PROCEDURE — 3079F PR MOST RECENT DIASTOLIC BLOOD PRESSURE 80-89 MM HG: ICD-10-PCS | Mod: CPTII,S$GLB,, | Performed by: FAMILY MEDICINE

## 2022-01-07 PROCEDURE — 1160F RVW MEDS BY RX/DR IN RCRD: CPT | Mod: CPTII,S$GLB,, | Performed by: FAMILY MEDICINE

## 2022-01-07 PROCEDURE — 1159F MED LIST DOCD IN RCRD: CPT | Mod: CPTII,S$GLB,, | Performed by: FAMILY MEDICINE

## 2022-01-07 PROCEDURE — 99214 OFFICE O/P EST MOD 30 MIN: CPT | Mod: S$GLB,,, | Performed by: FAMILY MEDICINE

## 2022-01-07 PROCEDURE — U0002: ICD-10-PCS | Mod: QW,CR,S$GLB, | Performed by: FAMILY MEDICINE

## 2022-01-07 RX ORDER — BENZONATATE 100 MG/1
100 CAPSULE ORAL EVERY 6 HOURS PRN
Qty: 30 CAPSULE | Refills: 1 | Status: SHIPPED | OUTPATIENT
Start: 2022-01-07 | End: 2022-10-28 | Stop reason: ALTCHOICE

## 2022-01-07 RX ORDER — PROMETHAZINE HYDROCHLORIDE 6.25 MG/5ML
SYRUP ORAL
Qty: 120 ML | Refills: 1 | Status: SHIPPED | OUTPATIENT
Start: 2022-01-07 | End: 2022-10-28

## 2022-01-07 NOTE — PROGRESS NOTES
Subjective:       Patient ID: Raúl Francis is a 36 y.o. male.    Vitals:  height is 6' (1.829 m) and weight is 93 kg (205 lb). His temperature is 98.8 °F (37.1 °C). His blood pressure is 129/81 and his pulse is 74. His respiration is 16 and oxygen saturation is 98%.     Chief Complaint: Fever    Patient presents to clinic with complaint of headaches, body aches, congestion, chills,  fever (101.5). Symptoms started yesterday. He is not vaccinated, no known covid exposure.    Fever   This is a new problem. The current episode started yesterday. The problem occurs constantly. The problem has been unchanged. The maximum temperature noted was 101 to 101.9 F. Associated symptoms include congestion, headaches and muscle aches. Pertinent negatives include no abdominal pain, chest pain, coughing, diarrhea, ear pain, nausea, rash, sleepiness, sore throat, urinary pain, vomiting or wheezing. He has tried nothing for the symptoms. The treatment provided no relief.       Constitution: Positive for fever.   HENT: Positive for congestion. Negative for ear pain and sore throat.    Cardiovascular: Negative for chest pain.   Respiratory: Negative for cough and wheezing.    Gastrointestinal: Negative for abdominal pain, nausea, vomiting and diarrhea.   Genitourinary: Negative for dysuria.   Skin: Negative for rash.   Neurological: Positive for headaches.       Objective:      Physical Exam      Physical Exam  Vitals signs and nursing note reviewed.   Constitutional:       Appearance: Pt is well-developed. Alert, NAD  HENT:      Head: Normocephalic and atraumatic. Pt appears well-developed and well-nourished. Pt is cooperative.  Non-toxic appearance. Pt does not have a sickly appearance. Pt does not appear ill. No distress     Right Ear: External ear normal. external ear and ear canal normal.      Left Ear: External ear normal. external ear and ear canal normal.   Eyes:      General: Lids are normal.      Conjunctiva/sclera:  Conjunctivae normal. Visual tracking is normal. Right eye exhibits no exudate. Left eye exhibits no exudate. No scleral icterus.     Pupils: Pupils are equal, round  Neck:      Musculoskeletal: Full passive range of motion without pain and neck supple.      Trachea: Trachea and phonation normal.   Cardiovascular:      Rate and Rhythm: Normal rate. Extremities well perfused.   Pulmonary:      Effort: Pulmonary effort is normal. No respiratory distress.     Breath sounds: Normal breath sounds.   Abdomen: NO obvious distention.  Musculoskeletal: Normal range of motion. No ambulation issues  Skin:     General: Skin is warm and dry. No open wounds or abrasions. No petechiae No cyanosis  no jaundice not diaphoretic, not pale, not purpuric  Neurological:      Mental Status:Pt is alert and oriented to person, place, and time.   Psychiatric:         Speech: Speech normal.         Behavior: Behavior normal.         Thought Content: Thought content normal.         Judgment: Judgment normal.         Assessment:       1. COVID-19    2. Fever, unspecified fever cause          Plan:         COVID-19    Fever, unspecified fever cause  -     POCT COVID-19 Rapid Screening    Other orders  -     benzonatate (TESSALON PERLES) 100 MG capsule; Take 1 capsule (100 mg total) by mouth every 6 (six) hours as needed for Cough.  Dispense: 30 capsule; Refill: 1  -     promethazine (PHENERGAN) 6.25 mg/5 mL syrup; 10mL at night as needed  Dispense: 120 mL; Refill: 1

## 2022-01-07 NOTE — PATIENT INSTRUCTIONS
Symptomatic treatment:    Alternate Tylenol and Ibuprofen every 3 hrs for fever, pain and inflammation. Avoid Nsaids if you are pregnant or have advanced kidney disease or take another daily medication with which it would interfere.      salt water gargles to soothe throat from post nasal drip  Honey/lemon water or warm tea to soothe throat from post nasal drip  Cepachol helps to soothe the discomfort in throat from post nasal drip    Cold-eeze (ZINC) helps to reduce the duration of URI symptoms if taken early  Elderberry to reduce duration of viral URI symptoms    Nasal saline spray reduces inflammation and dryness  Warm face compresses/hot showers as often as you can to open sinuses and allow to drain.   Flonase OTC or Nasacort OTC to help decrease inflammation in nasal turbinates and allow sinuses to drain    Vicks vapor rub at night  Simple foods like chicken noodle soup help provide hydration and nutrition    Delsym helps with coughing at night    Pepcid will help if there is reflux from the post nasal drip and helpful to take at night    Zyrtec/Claritin during the day and Benadryl at night may help if allergy component concurrently with URI    POSITIONALLY, SLEEPING ON STOMACH HELPS IF YOU ARE DIAGNOSED WITH COVID-19    Rest as much as you can    .

## 2022-01-31 DIAGNOSIS — K29.00 ACUTE GASTRITIS, PRESENCE OF BLEEDING UNSPECIFIED, UNSPECIFIED GASTRITIS TYPE: ICD-10-CM

## 2022-01-31 DIAGNOSIS — R11.0 NAUSEA: ICD-10-CM

## 2022-01-31 DIAGNOSIS — T88.7XXA SIDE EFFECT OF MEDICATION: ICD-10-CM

## 2022-01-31 DIAGNOSIS — R12 HEARTBURN: ICD-10-CM

## 2022-02-01 RX ORDER — OMEPRAZOLE 40 MG/1
CAPSULE, DELAYED RELEASE ORAL
Qty: 30 CAPSULE | Refills: 0 | OUTPATIENT
Start: 2022-02-01

## 2022-02-01 NOTE — TELEPHONE ENCOUNTER
No new care gaps identified.  Powered by [employer]. Reference number: [id number].   1/31/2022 6:40:29 PM CST

## 2022-02-02 NOTE — TELEPHONE ENCOUNTER
Provider Staff:     Action required for this patient.    Please note Refusal of medication.            Requested Prescriptions     Refused Prescriptions Disp Refills    omeprazole (PRILOSEC) 40 MG capsule [Pharmacy Med Name: OMEPRAZOLE 40MG CAPSULES] 30 capsule 0     Sig: TAKE 1 CAPSULE(40 MG) BY MOUTH EVERY DAY     Refused By: FLORINA REYES     Reason for Refusal: Patient needs an appointment      Thanks!  [facility]   Note composed: 02/01/2022 8:06 PM

## 2022-02-28 ENCOUNTER — PATIENT MESSAGE (OUTPATIENT)
Dept: ADMINISTRATIVE | Facility: HOSPITAL | Age: 37
End: 2022-02-28
Payer: COMMERCIAL

## 2022-05-31 ENCOUNTER — PATIENT MESSAGE (OUTPATIENT)
Dept: ADMINISTRATIVE | Facility: HOSPITAL | Age: 37
End: 2022-05-31
Payer: COMMERCIAL

## 2022-10-28 ENCOUNTER — OFFICE VISIT (OUTPATIENT)
Dept: FAMILY MEDICINE | Facility: CLINIC | Age: 37
End: 2022-10-28
Payer: COMMERCIAL

## 2022-10-28 ENCOUNTER — LAB VISIT (OUTPATIENT)
Dept: LAB | Facility: HOSPITAL | Age: 37
End: 2022-10-28
Payer: COMMERCIAL

## 2022-10-28 VITALS
SYSTOLIC BLOOD PRESSURE: 136 MMHG | OXYGEN SATURATION: 95 % | RESPIRATION RATE: 12 BRPM | WEIGHT: 208 LBS | BODY MASS INDEX: 28.21 KG/M2 | DIASTOLIC BLOOD PRESSURE: 80 MMHG | HEART RATE: 67 BPM

## 2022-10-28 DIAGNOSIS — Z87.898 HISTORY OF PALPITATIONS: ICD-10-CM

## 2022-10-28 DIAGNOSIS — K21.9 GASTROESOPHAGEAL REFLUX DISEASE, UNSPECIFIED WHETHER ESOPHAGITIS PRESENT: ICD-10-CM

## 2022-10-28 DIAGNOSIS — Z00.00 PREVENTATIVE HEALTH CARE: Primary | ICD-10-CM

## 2022-10-28 DIAGNOSIS — Z13.1 ENCOUNTER FOR SCREENING FOR DIABETES MELLITUS: ICD-10-CM

## 2022-10-28 DIAGNOSIS — E78.2 MIXED HYPERLIPIDEMIA: ICD-10-CM

## 2022-10-28 DIAGNOSIS — E34.9 HYPOTESTOSTERONISM: ICD-10-CM

## 2022-10-28 DIAGNOSIS — E61.1 IRON DEFICIENCY: ICD-10-CM

## 2022-10-28 PROBLEM — R51.9 SINUS HEADACHE: Status: RESOLVED | Noted: 2020-04-22 | Resolved: 2022-10-28

## 2022-10-28 PROBLEM — R53.83 FATIGUE: Status: RESOLVED | Noted: 2020-04-22 | Resolved: 2022-10-28

## 2022-10-28 LAB
ALBUMIN SERPL BCP-MCNC: 4.4 G/DL (ref 3.5–5.2)
ALP SERPL-CCNC: 75 U/L (ref 55–135)
ALT SERPL W/O P-5'-P-CCNC: 30 U/L (ref 10–44)
ANION GAP SERPL CALC-SCNC: 8 MMOL/L (ref 8–16)
AST SERPL-CCNC: 32 U/L (ref 10–40)
BASOPHILS # BLD AUTO: 0.06 K/UL (ref 0–0.2)
BASOPHILS NFR BLD: 1 % (ref 0–1.9)
BILIRUB SERPL-MCNC: 0.8 MG/DL (ref 0.1–1)
BUN SERPL-MCNC: 15 MG/DL (ref 6–20)
CALCIUM SERPL-MCNC: 9.8 MG/DL (ref 8.7–10.5)
CHLORIDE SERPL-SCNC: 106 MMOL/L (ref 95–110)
CHOLEST SERPL-MCNC: 227 MG/DL (ref 120–199)
CHOLEST/HDLC SERPL: 3.3 {RATIO} (ref 2–5)
CO2 SERPL-SCNC: 26 MMOL/L (ref 23–29)
CREAT SERPL-MCNC: 0.8 MG/DL (ref 0.5–1.4)
DIFFERENTIAL METHOD: NORMAL
EOSINOPHIL # BLD AUTO: 0.2 K/UL (ref 0–0.5)
EOSINOPHIL NFR BLD: 3.5 % (ref 0–8)
ERYTHROCYTE [DISTWIDTH] IN BLOOD BY AUTOMATED COUNT: 12.7 % (ref 11.5–14.5)
EST. GFR  (NO RACE VARIABLE): >60 ML/MIN/1.73 M^2
ESTIMATED AVG GLUCOSE: 108 MG/DL (ref 68–131)
FERRITIN SERPL-MCNC: 37 NG/ML (ref 20–300)
GLUCOSE SERPL-MCNC: 97 MG/DL (ref 70–110)
HBA1C MFR BLD: 5.4 % (ref 4–5.6)
HCT VFR BLD AUTO: 43.6 % (ref 40–54)
HDLC SERPL-MCNC: 69 MG/DL (ref 40–75)
HDLC SERPL: 30.4 % (ref 20–50)
HGB BLD-MCNC: 14.2 G/DL (ref 14–18)
IMM GRANULOCYTES # BLD AUTO: 0.01 K/UL (ref 0–0.04)
IMM GRANULOCYTES NFR BLD AUTO: 0.2 % (ref 0–0.5)
IRON SERPL-MCNC: 197 UG/DL (ref 45–160)
LDLC SERPL CALC-MCNC: 148.2 MG/DL (ref 63–159)
LYMPHOCYTES # BLD AUTO: 1.7 K/UL (ref 1–4.8)
LYMPHOCYTES NFR BLD: 29.2 % (ref 18–48)
MCH RBC QN AUTO: 30 PG (ref 27–31)
MCHC RBC AUTO-ENTMCNC: 32.6 G/DL (ref 32–36)
MCV RBC AUTO: 92 FL (ref 82–98)
MONOCYTES # BLD AUTO: 0.4 K/UL (ref 0.3–1)
MONOCYTES NFR BLD: 6.6 % (ref 4–15)
NEUTROPHILS # BLD AUTO: 3.4 K/UL (ref 1.8–7.7)
NEUTROPHILS NFR BLD: 59.5 % (ref 38–73)
NONHDLC SERPL-MCNC: 158 MG/DL
NRBC BLD-RTO: 0 /100 WBC
PLATELET # BLD AUTO: 240 K/UL (ref 150–450)
PMV BLD AUTO: 11.7 FL (ref 9.2–12.9)
POTASSIUM SERPL-SCNC: 4.2 MMOL/L (ref 3.5–5.1)
PROT SERPL-MCNC: 7.6 G/DL (ref 6–8.4)
RBC # BLD AUTO: 4.73 M/UL (ref 4.6–6.2)
SATURATED IRON: 40 % (ref 20–50)
SODIUM SERPL-SCNC: 140 MMOL/L (ref 136–145)
TESTOST SERPL-MCNC: 401 NG/DL (ref 304–1227)
TOTAL IRON BINDING CAPACITY: 497 UG/DL (ref 250–450)
TRANSFERRIN SERPL-MCNC: 336 MG/DL (ref 200–375)
TRIGL SERPL-MCNC: 49 MG/DL (ref 30–150)
TSH SERPL DL<=0.005 MIU/L-ACNC: 0.75 UIU/ML (ref 0.4–4)
WBC # BLD AUTO: 5.75 K/UL (ref 3.9–12.7)

## 2022-10-28 PROCEDURE — 99999 PR PBB SHADOW E&M-EST. PATIENT-LVL III: ICD-10-PCS | Mod: PBBFAC,,, | Performed by: STUDENT IN AN ORGANIZED HEALTH CARE EDUCATION/TRAINING PROGRAM

## 2022-10-28 PROCEDURE — 99395 PREV VISIT EST AGE 18-39: CPT | Mod: S$GLB,,, | Performed by: STUDENT IN AN ORGANIZED HEALTH CARE EDUCATION/TRAINING PROGRAM

## 2022-10-28 PROCEDURE — 99999 PR PBB SHADOW E&M-EST. PATIENT-LVL III: CPT | Mod: PBBFAC,,, | Performed by: STUDENT IN AN ORGANIZED HEALTH CARE EDUCATION/TRAINING PROGRAM

## 2022-10-28 PROCEDURE — 1159F MED LIST DOCD IN RCRD: CPT | Mod: CPTII,S$GLB,, | Performed by: STUDENT IN AN ORGANIZED HEALTH CARE EDUCATION/TRAINING PROGRAM

## 2022-10-28 PROCEDURE — 1160F PR REVIEW ALL MEDS BY PRESCRIBER/CLIN PHARMACIST DOCUMENTED: ICD-10-PCS | Mod: CPTII,S$GLB,, | Performed by: STUDENT IN AN ORGANIZED HEALTH CARE EDUCATION/TRAINING PROGRAM

## 2022-10-28 PROCEDURE — 80061 LIPID PANEL: CPT | Performed by: STUDENT IN AN ORGANIZED HEALTH CARE EDUCATION/TRAINING PROGRAM

## 2022-10-28 PROCEDURE — 80053 COMPREHEN METABOLIC PANEL: CPT | Performed by: STUDENT IN AN ORGANIZED HEALTH CARE EDUCATION/TRAINING PROGRAM

## 2022-10-28 PROCEDURE — 99395 PR PREVENTIVE VISIT,EST,18-39: ICD-10-PCS | Mod: S$GLB,,, | Performed by: STUDENT IN AN ORGANIZED HEALTH CARE EDUCATION/TRAINING PROGRAM

## 2022-10-28 PROCEDURE — 3075F PR MOST RECENT SYSTOLIC BLOOD PRESS GE 130-139MM HG: ICD-10-PCS | Mod: CPTII,S$GLB,, | Performed by: STUDENT IN AN ORGANIZED HEALTH CARE EDUCATION/TRAINING PROGRAM

## 2022-10-28 PROCEDURE — 1159F PR MEDICATION LIST DOCUMENTED IN MEDICAL RECORD: ICD-10-PCS | Mod: CPTII,S$GLB,, | Performed by: STUDENT IN AN ORGANIZED HEALTH CARE EDUCATION/TRAINING PROGRAM

## 2022-10-28 PROCEDURE — 84443 ASSAY THYROID STIM HORMONE: CPT | Performed by: STUDENT IN AN ORGANIZED HEALTH CARE EDUCATION/TRAINING PROGRAM

## 2022-10-28 PROCEDURE — 3079F DIAST BP 80-89 MM HG: CPT | Mod: CPTII,S$GLB,, | Performed by: STUDENT IN AN ORGANIZED HEALTH CARE EDUCATION/TRAINING PROGRAM

## 2022-10-28 PROCEDURE — 82728 ASSAY OF FERRITIN: CPT | Performed by: STUDENT IN AN ORGANIZED HEALTH CARE EDUCATION/TRAINING PROGRAM

## 2022-10-28 PROCEDURE — 36415 COLL VENOUS BLD VENIPUNCTURE: CPT | Mod: PN | Performed by: STUDENT IN AN ORGANIZED HEALTH CARE EDUCATION/TRAINING PROGRAM

## 2022-10-28 PROCEDURE — 84403 ASSAY OF TOTAL TESTOSTERONE: CPT | Performed by: STUDENT IN AN ORGANIZED HEALTH CARE EDUCATION/TRAINING PROGRAM

## 2022-10-28 PROCEDURE — 3079F PR MOST RECENT DIASTOLIC BLOOD PRESSURE 80-89 MM HG: ICD-10-PCS | Mod: CPTII,S$GLB,, | Performed by: STUDENT IN AN ORGANIZED HEALTH CARE EDUCATION/TRAINING PROGRAM

## 2022-10-28 PROCEDURE — 83036 HEMOGLOBIN GLYCOSYLATED A1C: CPT | Performed by: STUDENT IN AN ORGANIZED HEALTH CARE EDUCATION/TRAINING PROGRAM

## 2022-10-28 PROCEDURE — 3075F SYST BP GE 130 - 139MM HG: CPT | Mod: CPTII,S$GLB,, | Performed by: STUDENT IN AN ORGANIZED HEALTH CARE EDUCATION/TRAINING PROGRAM

## 2022-10-28 PROCEDURE — 84466 ASSAY OF TRANSFERRIN: CPT | Performed by: STUDENT IN AN ORGANIZED HEALTH CARE EDUCATION/TRAINING PROGRAM

## 2022-10-28 PROCEDURE — 85025 COMPLETE CBC W/AUTO DIFF WBC: CPT | Performed by: STUDENT IN AN ORGANIZED HEALTH CARE EDUCATION/TRAINING PROGRAM

## 2022-10-28 PROCEDURE — 1160F RVW MEDS BY RX/DR IN RCRD: CPT | Mod: CPTII,S$GLB,, | Performed by: STUDENT IN AN ORGANIZED HEALTH CARE EDUCATION/TRAINING PROGRAM

## 2022-10-28 RX ORDER — OMEPRAZOLE 40 MG/1
40 CAPSULE, DELAYED RELEASE ORAL DAILY
Qty: 45 CAPSULE | Refills: 0 | Status: SHIPPED | OUTPATIENT
Start: 2022-10-28 | End: 2022-12-11

## 2022-10-28 NOTE — PROGRESS NOTES
Subjective:      Patient ID: Raúl Francis is a 37 y.o. male    Chief Complaint   Patient presents with    Follow-up     HPI  37 y.o. male with a PMHx as documented below presents to clinic today for the following:  - Annual exam, no specific complaints and concerns.  - Pt reports history of acid reflux, worse over the past several months. Discussed diet and lifestyle interventions.   - Discussed age appropriate preventative healthcare items such as cancer screenings and recommended immunizations. Discussed whether patient is using tobacco, alcohol, or illicit drugs and any concerns were discussed. Discussed maintenance of a healthy weight. Patient queried if he has any additional questions about preventative healthcare and all questions were answered.     Past Medical History:   Diagnosis Date    Environmental and seasonal allergies 02/22/2020    Erectile dysfunction 04/22/2020    Generalized anxiety disorder 04/22/2020    Hypotestosteronism 04/22/2020    Mixed hyperlipidemia 04/22/2020    Opiate abuse, episodic     On suboxone; from [place].    Overweight (BMI 25.0-29.9)     Panic attacks 04/22/2020    Situational anxiety 02/22/2020      Review of Systems   Constitutional:  Negative for chills and fever.   Respiratory:  Negative for shortness of breath.    Cardiovascular:  Negative for chest pain.   Gastrointestinal:  Negative for abdominal pain, constipation, diarrhea, nausea and vomiting.   Genitourinary:  Negative for dysuria.   Musculoskeletal:  Negative for back pain and neck pain.   Skin:  Negative for rash.   Neurological:  Negative for dizziness, sensory change, weakness and headaches.   Psychiatric/Behavioral:  Negative for depression. The patient is not nervous/anxious.      Objective:      Vitals:    10/28/22 0851   BP: 136/80   Pulse: 67   Resp: 12     Physical Exam  Vitals reviewed.   Constitutional:       General: He is not in acute distress.  HENT:      Head: Normocephalic and atraumatic.    Cardiovascular:      Rate and Rhythm: Normal rate.   Pulmonary:      Effort: Pulmonary effort is normal. No respiratory distress.   Neurological:      General: No focal deficit present.      Mental Status: He is alert and oriented to person, place, and time. Mental status is at baseline.      Assessment:       1. Preventative health care    2. Iron deficiency    3. Mixed hyperlipidemia    4. Encounter for screening for diabetes mellitus    5. History of palpitations    6. Hypotestosteronism    7. Gastroesophageal reflux disease, unspecified whether esophagitis present      Plan:       Raúl was seen today for follow-up.    Diagnoses and all orders for this visit:    Preventative health care    Iron deficiency  -     CBC Auto Differential; Future  -     IRON AND TIBC; Future  -     FERRITIN; Future    Mixed hyperlipidemia  -     Lipid Panel; Future    Encounter for screening for diabetes mellitus  -     Hemoglobin A1C; Future    History of palpitations  -     Comprehensive Metabolic Panel; Future  -     TSH; Future    Hypotestosteronism  -     TESTOSTERONE; Future    Gastroesophageal reflux disease, unspecified whether esophagitis present  -     omeprazole (PRILOSEC) 40 MG capsule; Take 1 capsule (40 mg total) by mouth once daily.      Follow up in about 1 year (around 10/28/2023), or if symptoms worsen or fail to improve.    Arelis Mahoney MD  [facility]  Office: [phone]   Fax: [phone]  10/28/2022      Disclaimer: This note was partly generated using dictation software which may occasionally result in transcription errors.

## 2023-03-17 ENCOUNTER — OFFICE VISIT (OUTPATIENT)
Dept: URGENT CARE | Facility: CLINIC | Age: 38
End: 2023-03-17
Payer: COMMERCIAL

## 2023-03-17 VITALS
OXYGEN SATURATION: 98 % | DIASTOLIC BLOOD PRESSURE: 78 MMHG | RESPIRATION RATE: 20 BRPM | WEIGHT: 210 LBS | HEIGHT: 72 IN | SYSTOLIC BLOOD PRESSURE: 129 MMHG | HEART RATE: 74 BPM | BODY MASS INDEX: 28.44 KG/M2 | TEMPERATURE: 98 F

## 2023-03-17 DIAGNOSIS — J02.9 SORE THROAT: Primary | ICD-10-CM

## 2023-03-17 LAB
CTP QC/QA: YES
CTP QC/QA: YES
MOLECULAR STREP A: NEGATIVE
SARS-COV-2 AG RESP QL IA.RAPID: NEGATIVE

## 2023-03-17 PROCEDURE — 99213 OFFICE O/P EST LOW 20 MIN: CPT | Mod: S$GLB,,, | Performed by: FAMILY MEDICINE

## 2023-03-17 PROCEDURE — 87651 STREP A DNA AMP PROBE: CPT | Mod: QW,S$GLB,, | Performed by: FAMILY MEDICINE

## 2023-03-17 PROCEDURE — 87811 SARS-COV-2 COVID19 W/OPTIC: CPT | Mod: QW,S$GLB,, | Performed by: FAMILY MEDICINE

## 2023-03-17 PROCEDURE — 87651 POCT STREP A MOLECULAR: ICD-10-PCS | Mod: QW,S$GLB,, | Performed by: FAMILY MEDICINE

## 2023-03-17 PROCEDURE — 99213 PR OFFICE/OUTPT VISIT, EST, LEVL III, 20-29 MIN: ICD-10-PCS | Mod: S$GLB,,, | Performed by: FAMILY MEDICINE

## 2023-03-17 PROCEDURE — 87811 SARS CORONAVIRUS 2 ANTIGEN POCT, MANUAL READ: ICD-10-PCS | Mod: QW,S$GLB,, | Performed by: FAMILY MEDICINE

## 2023-03-17 NOTE — PROGRESS NOTES
Subjective:       Patient ID: Raúl Francis is a 37 y.o. male.    Vitals:  height is 6' (1.829 m) and weight is 95.3 kg (210 lb). His oral temperature is 98 °F (36.7 °C). His blood pressure is 129/78 and his pulse is 74. His respiration is 20 and oxygen saturation is 98%.     Chief Complaint: Sore Throat    Patient presents today with c/o sore throat since yesterday. Pt has not taken anything for symptoms. Pt has no known exposures. Pt is not vaccinated. Pain level 0/10. Covid and Strep test done at pt's request.    Sore Throat   This is a new problem. The current episode started yesterday. The problem has been unchanged. Neither side of throat is experiencing more pain than the other. There has been no fever. The pain is at a severity of 0/10. The patient is experiencing no pain. Associated symptoms include congestion, headaches and shortness of breath. He has tried nothing for the symptoms. The treatment provided no relief.     HENT:  Positive for congestion and sore throat.    Respiratory:  Positive for shortness of breath.    Neurological:  Positive for headaches.     Objective:      Physical Exam      Physical Exam  Vitals signs and nursing note reviewed.   Constitutional:       Appearance: Pt is well-developed. Alert, NAD.  Pt is cooperative.  Non-toxic appearance.  HENT:      Head: Normocephalic and atraumatic. .      Right Ear: External ear normal.      Left Ear: External ear normal.   Eyes:      General: Lids are normal.      Conjunctiva/sclera: Conjunctivae normal. Visual tracking is normal. Right eye exhibits no exudate. Left eye exhibits no exudate. No scleral icterus.     Pupils: Pupils are equal, round  Neck:      Musculoskeletal: range of motion without pain and neck supple.      Trachea: Trachea and phonation normal.   Throat: No apparent pharyngeal edema or swelling  Cardiovascular:      Rate and Rhythm: Normal Rhythm. Extremities well perfused.   Pulmonary:      Effort: Pulmonary effort is  normal. No respiratory distress. NO stridor or difficulty breathing     Breath sounds: Normal breath sounds.   Abdomen: NO obvious distention.  Musculoskeletal: Normal range of motion. No ambulation issues  Skin:     General: Skin is warm and dry. No open wounds or abrasions. No petechiae No cyanosis  no jaundice not diaphoretic, not pale, not purpuric  Neurological:      Mental Status:Pt is alert and oriented to person, place, and time.   Psychiatric:         Speech: Speech normal.         Behavior: Behavior normal.         Thought Content: Thought content normal.         Judgment: Judgment normal.       Assessment:       1. Sore throat          Plan:     Patient's symptoms have resolved he wanted to make sure was okay to go around other people.   Sore throat  -     SARS Coronavirus 2 Antigen, POCT Manual Read  -     POCT Strep A, Molecular

## 2023-12-22 ENCOUNTER — TELEPHONE (OUTPATIENT)
Dept: FAMILY MEDICINE | Facility: CLINIC | Age: 38
End: 2023-12-22
Payer: COMMERCIAL

## 2023-12-22 NOTE — TELEPHONE ENCOUNTER
Called pt to inform him that we will have to reschedule his flu shot due to the clinic not having any at the moment. Pt vu and said he will reschedule himself on the tuan for the flu shot.